# Patient Record
Sex: MALE | Race: WHITE | NOT HISPANIC OR LATINO | Employment: UNEMPLOYED | URBAN - METROPOLITAN AREA
[De-identification: names, ages, dates, MRNs, and addresses within clinical notes are randomized per-mention and may not be internally consistent; named-entity substitution may affect disease eponyms.]

---
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
## 2017-05-12 ENCOUNTER — ALLSCRIPTS OFFICE VISIT (OUTPATIENT)
Dept: OTHER | Facility: OTHER | Age: 22
End: 2017-05-12

## 2017-05-18 ENCOUNTER — LAB CONVERSION - ENCOUNTER (OUTPATIENT)
Dept: OTHER | Facility: OTHER | Age: 22
End: 2017-05-18

## 2017-05-18 LAB
A/G RATIO (HISTORICAL): 2.1 (CALC) (ref 1–2.5)
ALBUMIN SERPL BCP-MCNC: 4.8 G/DL (ref 3.6–5.1)
ALP SERPL-CCNC: 34 U/L (ref 40–115)
ALT SERPL W P-5'-P-CCNC: 6 U/L (ref 9–46)
AST SERPL W P-5'-P-CCNC: 9 U/L (ref 10–40)
BILIRUB SERPL-MCNC: 0.7 MG/DL (ref 0.2–1.2)
BUN SERPL-MCNC: 13 MG/DL (ref 7–25)
BUN/CREA RATIO (HISTORICAL): ABNORMAL (CALC) (ref 6–22)
CALCIUM (ADJUSTED FOR ALBUMIN) (HISTORICAL): 9.1 MG/DL (CALC) (ref 8.6–10.2)
CALCIUM SERPL-MCNC: 9.4 MG/DL (ref 8.6–10.3)
CHLORIDE SERPL-SCNC: 102 MMOL/L (ref 98–110)
CO2 SERPL-SCNC: 31 MMOL/L (ref 20–31)
CREAT SERPL-MCNC: 0.95 MG/DL (ref 0.6–1.35)
DEPRECATED RDW RBC AUTO: 12.9 % (ref 11–15)
EGFR AFRICAN AMERICAN (HISTORICAL): 131 ML/MIN/1.73M2
EGFR-AMERICAN CALC (HISTORICAL): 113 ML/MIN/1.73M2
FOLATE SERPL-MCNC: 18.2 NG/ML
GAMMA GLOBULIN (HISTORICAL): 2.3 G/DL (CALC) (ref 1.9–3.7)
GLUCOSE (HISTORICAL): 99 MG/DL (ref 65–99)
HBA1C MFR BLD HPLC: 5.1 % OF TOTAL HGB
HCT VFR BLD AUTO: 46.8 % (ref 38.5–50)
HGB BLD-MCNC: 15.6 G/DL (ref 13.2–17.1)
IGA (HISTORICAL): 269 MG/DL (ref 81–463)
INTERPRETATION (HISTORICAL): NORMAL
MCH RBC QN AUTO: 28.4 PG (ref 27–33)
MCHC RBC AUTO-ENTMCNC: 33.3 G/DL (ref 32–36)
MCV RBC AUTO: 85.2 FL (ref 80–100)
PLATELET # BLD AUTO: 232 THOUSAND/UL (ref 140–400)
PMV BLD AUTO: 8.2 FL (ref 7.5–12.5)
POTASSIUM SERPL-SCNC: 4.2 MMOL/L (ref 3.5–5.3)
RBC # BLD AUTO: 5.5 MILLION/UL (ref 4.2–5.8)
SODIUM SERPL-SCNC: 139 MMOL/L (ref 135–146)
T4 FREE SERPL-MCNC: 1.5 NG/DL (ref 0.8–2.7)
T4 TOTAL (HISTORICAL): 8.4 MCG/DL (ref 4.8–10.4)
TESTOSTERONE FREE (HISTORICAL): 144.7 PG/ML (ref 35–155)
TESTOSTERONE TOTAL (HISTORICAL): 742 NG/DL (ref 250–1100)
TOTAL PROTEIN (HISTORICAL): 7.1 G/DL (ref 6.1–8.1)
TSH SERPL DL<=0.05 MIU/L-ACNC: 0.81 MIU/L (ref 0.4–4.5)
TTG IGA (HISTORICAL): 1 U/ML
VIT B12 SERPL-MCNC: 559 PG/ML (ref 200–1100)
WBC # BLD AUTO: 6.1 THOUSAND/UL (ref 3.8–10.8)

## 2017-05-24 ENCOUNTER — ALLSCRIPTS OFFICE VISIT (OUTPATIENT)
Dept: OTHER | Facility: OTHER | Age: 22
End: 2017-05-24

## 2017-06-02 ENCOUNTER — ALLSCRIPTS OFFICE VISIT (OUTPATIENT)
Dept: OTHER | Facility: OTHER | Age: 22
End: 2017-06-02

## 2017-07-03 ENCOUNTER — ALLSCRIPTS OFFICE VISIT (OUTPATIENT)
Dept: OTHER | Facility: OTHER | Age: 22
End: 2017-07-03

## 2017-07-28 ENCOUNTER — ALLSCRIPTS OFFICE VISIT (OUTPATIENT)
Dept: OTHER | Facility: OTHER | Age: 22
End: 2017-07-28

## 2017-12-01 ENCOUNTER — ALLSCRIPTS OFFICE VISIT (OUTPATIENT)
Dept: OTHER | Facility: OTHER | Age: 22
End: 2017-12-01

## 2017-12-05 NOTE — PROGRESS NOTES
Assessment    1  Depression (311) (F32 9)   2  Anxiety (300 00) (F41 9)   3  Early satiety (780 94) (R68 81)   4  Loss of appetite for more than 2 weeks (783 0) (R63 0)    Plan  Abnormal weight loss, Loss of appetite for more than 2 weeks    · Cyproheptadine HCl - 4 MG Oral Tablet; TAKE 1 TABLET 4 TIMES DAILY  Anxiety    · ALPRAZolam 0 5 MG Oral Tablet; take 1 tablet PO daily PRN severe anxiety/panic   · PARoxetine HCl - 20 MG Oral Tablet; TAKE 1 TABLET EVERY DAY in AM  Early satiety, Loss of appetite for more than 2 weeks    · *1 - [facility]  *ongoing loss ofappetite and early satiety - improved with cyproheptadine but once d/c sxsreturn  he has responded well to paxil from emotional standpoint but unsure if sxswith eating could be physical - please eval   Status: Active  Requested for: 29QCO5369  Care Summary provided  : Yes    Discussion/Summary    69-year-old male presenting today for close follow-up to depression with anxiety as well as suppressed appetite  Overall patient is very happy with his response to Paxil and overall from an emotional standpoint has been doing very well  We will maintain him on the current dose of Paxil  Patient also has had an excellent response with improvement in appetite and weight gain with the Cyproheptadine  However, patient states he tried stopping the medication and his appetite immediately plummeted and he lost 4 lb in about a week  Once again I keep reiterating to him that I am uncertain if this is psychological or from a physical GI cause  He does report some early satiety when he does eat so I would recommend that we evaluate him from a physical gastro standpoint and I will have him consult with GI  Referral given  We will follow up with him in 4 months time   He wants to try to take the Cyproheptadine less frequently so I told him he can at least try taking it t i d  for a few weeks and then b i d  for a few weeks and see how he does   were filled for him today  Possible side effects of new medications were reviewed with the patient/guardian today  The treatment plan was reviewed with the patient/guardian  The patient/guardian understands and agrees with the treatment plan      Chief Complaint  pt here for 4 month f/u, pt states that he is been feeling much better with medication  Patient is here today for follow up of chronic conditions described in HPI  History of Present Illness  23y/o male here to f/u for depression with anxiety and appetite decrease  he is feeling a lot better overall and is extremely happy with how he feels on paxil  he is tolerating it well  He tried off of cyproheptadine and unfortunately his appetite dropped again and states he lost 4lbs in a week  eating smaller, less frequent, more because he had to eat, he states  denies physical sxs aside from early satiety  Review of Systems   Constitutional: No fever or chills, feels well, no tiredness, no recent weight gain or weight loss  Cardiovascular: No complaints of slow heart rate, no fast heart rate, no chest pain, no palpitations, no leg claudication, no lower extremity  Respiratory: No complaints of shortness of breath, no wheezing, no cough, no SOB on exertion, no orthopnea or PND  Gastrointestinal: as noted in HPI  Genitourinary: No complaints of dysuria, no incontinence, no hesitancy, no nocturia, no genital lesion, no testicular pain  Psychiatric: Is not suicidal, no sleep disturbances, no anxiety or depression, no change in personality, no emotional problems  Active Problems  1  Abdominal pain, epigastric (789 06) (R10 13)   2  Abnormal weight loss (783 21) (R63 4)   3  Anxiety (300 00) (F41 9)   4  Chronic fatigue (780 79) (R53 82)   5  Decreased libido (799 81) (R68 82)   6  Depression (311) (F32 9)   7  Episode of shaking (781 0) (R25 1)   8  Loose stools (787 7) (R19 5)   9   Loss of appetite for more than 2 weeks (783 0) (R63 0)    Past Medical History  1  History of Abdominal cramping (789 00) (R10 9)   2  History of Acute upper respiratory infection (465 9) (J06 9)   3  History of Closed Fracture Of Multiple Right Ribs (807 09)   4  History of Hepatic Laceration (864 05)   5  History of acute sinusitis (V12 69) (Z87 09)   6  History of allergic rhinitis (V12 69) (Z87 09)   7  History of fever (V13 89) (Z87 898)   8  History of gastroenteritis (V12 79) (Z87 19)   9  History of insomnia (V13 89) (Z87 898)   10  History of Joint Pain Hip Difficult To Localize   11  History of Open Wound Of The Face (873 40)    Family History  Family History    1  Family history of Mental problems    The family history was reviewed and updated today  Social History     · Single   · Student   · Unemployed (V62 0) (Z56 0)   · Use of nicotine containing substance in combustion-free vaporization device (V49 89)(Z78 9)  The social history was reviewed and updated today  Current Meds   1  ALPRAZolam 0 5 MG Oral Tablet; take 1 tablet PO daily PRN severe anxiety/panic; Therapy: 19VLW4282 to (Last Rx:90Vcv0514) Ordered   2  Cyproheptadine HCl - 4 MG Oral Tablet; TAKE 1 TABLET 4 TIMES DAILY; Therapy: 23UUJ8260 to (Evaluate:16Dwk6233)  Requested for: 29FNS7866; Last Rx:66Nml2957 Ordered   3  PARoxetine HCl - 20 MG Oral Tablet; TAKE 1 TABLET EVERY DAY in AM; Therapy: 62EAL4010 to ([phone])  Requested for: 18Mse6003; Last Rx:64Zvq7563 Ordered    The medication list was reviewed and updated today  Allergies  1  Amoxicillin CAPS   2  Penicillins   3   Sulfa Drugs    Vitals  Vital Signs    Recorded: 01XQF9499 04:14PM   Temperature 98 3 F, Tympanic   Heart Rate 98   Pulse Quality Normal   Respiration Quality Normal   Respiration 20   Systolic 978, LUE, Sitting   Diastolic 80, LUE, Sitting   Height 5 ft 7 in   Weight 119 lb 2 oz   BMI Calculated 18 66   BSA Calculated 1 62   O2 Saturation 98   Pain Scale 0       Physical Exam   Constitutional General appearance: No acute distress, well appearing and well nourished  appears healthy,-- within normal limits of ideal weight-- and-- appearance reflects stated age  Pulmonary  Respiratory effort: No increased work of breathing or signs of respiratory distress  Auscultation of lungs: Clear to auscultation, equal breath sounds bilaterally, no wheezes, no rales, no rhonci  Cardiovascular  Auscultation of heart: Normal rate and rhythm, normal S1 and S2, without murmurs  Examination of extremities for edema and/or varicosities: Normal    Carotid pulses: Normal    Abdomen  Abdomen: Non-tender, no masses  The abdomen was flat  Bowel sounds were normal  The abdomen was soft and nontender  Lymphatic  Palpation of lymph nodes in neck: No lymphadenopathy  Psychiatric  Orientation to person, place and time: Normal    Mood and affect: Normal    Additional Exam:  vitals reviewed  Results/Data  PHQ-9 Adult Depression Screening 15BMS3568 04:20PM User, [employer]     Test Name Result Flag Reference   PHQ-9 Adult Depression Score 6       Over the last two weeks, how often have you been bothered by any of the following problems? Little interest or pleasure in doing things: Several days - 1 Feeling down, depressed, or hopeless: Several days - 1 Trouble falling or staying asleep, or sleeping too much: Several days - 1 Feeling tired or having little energy: Several days - 1 Poor appetite or over eating: Not at all - 0 Feeling bad about yourself - or that you are a failure or have let yourself or your family down: Not at all - 0 Trouble concentrating on things, such as reading the newspaper or watching television: Several days - 1 Moving or speaking so slowly that other people could have noticed   Or the opposite -  being so fidgety or restless that you have been moving around a lot more than usual: Several days - 1 Thoughts that you would be better off dead, or of hurting yourself in some way: Not at all - 0   PHQ-9 Adult Depression Screening Negative     PHQ-9 Difficulty Level Somewhat difficult     PHQ-9 Severity Mild Depression         Future Appointments    Date/Time Provider Specialty Site   03/30/2018 04:15 PM Carol Ann Cope,  [address]   Electronically signed by : Tomi Darling, [facility]; Dec  1 2017  5:14PM EST                       (Author)    Electronically signed by : Christianne Caldera DO; Dec  1 2017  5:59PM EST                       (Author)

## 2018-01-12 VITALS
DIASTOLIC BLOOD PRESSURE: 72 MMHG | WEIGHT: 107.56 LBS | SYSTOLIC BLOOD PRESSURE: 124 MMHG | RESPIRATION RATE: 16 BRPM | HEIGHT: 67 IN | BODY MASS INDEX: 16.88 KG/M2 | HEART RATE: 95 BPM | OXYGEN SATURATION: 99 % | TEMPERATURE: 99.6 F

## 2018-01-13 VITALS
WEIGHT: 108.13 LBS | HEART RATE: 102 BPM | DIASTOLIC BLOOD PRESSURE: 70 MMHG | HEIGHT: 67 IN | TEMPERATURE: 99.2 F | BODY MASS INDEX: 16.97 KG/M2 | SYSTOLIC BLOOD PRESSURE: 98 MMHG | OXYGEN SATURATION: 97 % | RESPIRATION RATE: 16 BRPM

## 2018-01-13 NOTE — PSYCH
History of Present Illness  Psychotherapy Provided [facility]: Individual Psychotherapy 35 minutes provided today  Goals addressed in session:   Met with pt for the initial session  Discussed pt's physical issues that he has been experiencing which led to a referral to this worker as they may be linked to his depression and anxiety  Pt states that he has struggled with anxiety and depression for years  Discussed pt's negative coping skills in the past including his drug use  Discussed pt's symptoms including irritability, lack of interest in things, lack of motivation, with drawing from others, and decreased appetite  Pt will follow up in a few weeks to continue to talk about coping skills and gain support  HPI - Psych: Pt is not currently on anything for the depression/anxiety  Pt is considering medication but has not started anything at this time  Pt reports that he is going to school full time for Biology and that he does enjoy it  Pt has some supports within his family  Pt states that he does enjoy creating music and working out  Pt was calm and cooperative throughout the session  Pt's mood and affect were within normal limits  Pt denies SI   Note   Note:   Pt will follow up with this worker in a few weeks for continued support and to work on establishing some coping skills that would be beneficial for him to use when managing his anxiety/depression  Assessment    1   Depression (311) (F32 9)    Signatures   Electronically signed by : Estrella Rivas LCSW; Jun 2 2017  4:39PM EST                       (Author)

## 2018-01-14 VITALS
OXYGEN SATURATION: 99 % | RESPIRATION RATE: 17 BRPM | TEMPERATURE: 98.9 F | DIASTOLIC BLOOD PRESSURE: 80 MMHG | WEIGHT: 100.06 LBS | HEART RATE: 113 BPM | SYSTOLIC BLOOD PRESSURE: 138 MMHG | HEIGHT: 67 IN | BODY MASS INDEX: 15.71 KG/M2

## 2018-01-14 VITALS
SYSTOLIC BLOOD PRESSURE: 130 MMHG | DIASTOLIC BLOOD PRESSURE: 60 MMHG | TEMPERATURE: 99.7 F | RESPIRATION RATE: 17 BRPM | WEIGHT: 101.25 LBS | OXYGEN SATURATION: 98 % | HEART RATE: 82 BPM | HEIGHT: 67 IN | BODY MASS INDEX: 15.89 KG/M2

## 2018-01-22 VITALS
HEART RATE: 98 BPM | RESPIRATION RATE: 20 BRPM | HEIGHT: 67 IN | TEMPERATURE: 98.3 F | WEIGHT: 119.13 LBS | SYSTOLIC BLOOD PRESSURE: 118 MMHG | BODY MASS INDEX: 18.7 KG/M2 | OXYGEN SATURATION: 98 % | DIASTOLIC BLOOD PRESSURE: 80 MMHG

## 2018-02-05 DIAGNOSIS — F41.9 ANXIETY: Primary | ICD-10-CM

## 2018-02-05 RX ORDER — ALPRAZOLAM 0.5 MG/1
1 TABLET ORAL DAILY PRN
COMMUNITY
Start: 2017-05-24 | End: 2018-02-05 | Stop reason: SDUPTHER

## 2018-02-05 RX ORDER — ALPRAZOLAM 0.5 MG/1
0.5 TABLET ORAL DAILY PRN
Qty: 30 TABLET | Refills: 0 | OUTPATIENT
Start: 2018-02-05 | End: 2018-03-30 | Stop reason: SDUPTHER

## 2018-02-13 ENCOUNTER — TRANSCRIBE ORDERS (OUTPATIENT)
Dept: GASTROENTEROLOGY | Facility: MEDICAL CENTER | Age: 23
End: 2018-02-13

## 2018-02-16 ENCOUNTER — OFFICE VISIT (OUTPATIENT)
Dept: GASTROENTEROLOGY | Facility: MEDICAL CENTER | Age: 23
End: 2018-02-16
Payer: COMMERCIAL

## 2018-02-16 VITALS
WEIGHT: 119 LBS | TEMPERATURE: 98.2 F | HEIGHT: 67 IN | SYSTOLIC BLOOD PRESSURE: 116 MMHG | HEART RATE: 90 BPM | DIASTOLIC BLOOD PRESSURE: 72 MMHG | BODY MASS INDEX: 18.68 KG/M2

## 2018-02-16 DIAGNOSIS — R63.0 LOSS OF APPETITE FOR MORE THAN 2 WEEKS: ICD-10-CM

## 2018-02-16 DIAGNOSIS — R19.5 LOOSE STOOLS: ICD-10-CM

## 2018-02-16 DIAGNOSIS — R68.81 EARLY SATIETY: ICD-10-CM

## 2018-02-16 DIAGNOSIS — R63.4 ABNORMAL WEIGHT LOSS: Primary | ICD-10-CM

## 2018-02-16 PROCEDURE — 99244 OFF/OP CNSLTJ NEW/EST MOD 40: CPT | Performed by: INTERNAL MEDICINE

## 2018-02-16 RX ORDER — CYPROHEPTADINE HYDROCHLORIDE 4 MG/1
4 TABLET ORAL 4 TIMES DAILY
Refills: 3 | COMMUNITY
Start: 2018-01-21 | End: 2018-06-29 | Stop reason: SDUPTHER

## 2018-02-16 RX ORDER — PAROXETINE HYDROCHLORIDE 20 MG/1
TABLET, FILM COATED ORAL
COMMUNITY
Start: 2018-02-05 | End: 2018-03-30 | Stop reason: SDUPTHER

## 2018-02-16 NOTE — PROGRESS NOTES
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  However I am concerned about difficulty he has had gaining weight and the fact that he is reliant on the cyproheptadine to maintain his weight  I will schedule him for an upper endoscopy and colonoscopy to rule out peptic ulcer disease, inflammatory bowel disease, celiac sprue, and microscopic colitis  Since he has previously had a TSH and celiac panel that were negative within the past year I will not repeat them at this time  - Case request operating room: EGD AND COLONOSCOPY      ______________________________________________________________________    HPI:  He presents for evaluation of chronic decreased appetite, difficulty gaining weight, intermittent weight loss when he is not taking cyproheptadine, nausea, early satiety, and diarrhea  He has not had any bleeding and he has never previously had an upper endoscopy or colonoscopy  He denies any family history of colon polyps, colon cancer, and Crohn's disease  He is concerned about his persistent symptoms and the fact that he has been unable to gain weight and when he stops his cyproheptadine he quickly starts losing weight  REVIEW OF SYSTEMS:    CONSTITUTIONAL: Denies any fever, chills, rigors, but complains of weight loss  HEENT: No earache or tinnitus  Denies hearing loss or visual disturbances  CARDIOVASCULAR: No chest pain or palpitations  RESPIRATORY: Denies any cough, hemoptysis, shortness of breath or dyspnea on exertion  GASTROINTESTINAL: As noted in the History of Present Illness  GENITOURINARY: No problems with urination  Denies any hematuria or dysuria  NEUROLOGIC: No dizziness or vertigo, denies headaches  MUSCULOSKELETAL: Denies any muscle or joint pain  SKIN: Denies skin rashes or itching  ENDOCRINE: Denies excessive thirst  Denies intolerance to heat or cold  PSYCHOSOCIAL: Denies depression or anxiety  Denies any recent memory loss  Historical Information   No past medical history on file  No past surgical history on file  Social History   History   Alcohol use Not on file     History   Drug use: Unknown     History   Smoking Status    Never Smoker   Smokeless Tobacco    Never Used     No family history on file  Meds/Allergies       Current Outpatient Prescriptions:     ALPRAZolam (XANAX) 0 5 mg tablet    cyproheptadine (PERIACTIN) 4 mg tablet    PARoxetine (PAXIL) 20 mg tablet    Allergies   Allergen Reactions    Amoxicillin Other (See Comments)     unknown    Penicillins Edema    Sulfa Antibiotics Other (See Comments)     unknown           Objective     Blood pressure 116/72, pulse 90, temperature 98 2 °F (36 8 °C), temperature source Oral, height 5' 7" (1 702 m), weight 54 kg (119 lb)  PHYSICAL EXAM:      General Appearance:   Alert, cooperative, no distress, thin   HEENT:   Normocephalic, atraumatic, anicteric      Neck:  Supple, symmetrical, trachea midline   Lungs:   Clear to auscultation bilaterally; no rales, rhonchi or wheezing; respirations unlabored    Heart[de-identified]   Regular rate and rhythm; no murmur, rub, or gallop  Abdomen:   Soft, epigastric tenderness, non-distended; normal bowel sounds; no masses, no organomegaly    Genitalia:   Deferred    Rectal:   Deferred    Extremities:  No cyanosis, clubbing or edema    Pulses:  2+ and symmetric    Skin:  No jaundice, rashes, or lesions    Lymph nodes:  No palpable cervical lymphadenopathy        Lab Results:   No visits with results within 1 Day(s) from this visit     Latest known visit with results is:   Lab Conversion - Encounter on 05/18/2017   Component Date Value    TTG IGA 05/13/2017 1     IGA 05/13/2017 269     INTERPRETATION 05/13/2017 No serological evidence of celiac disease  tTG IgA may normalize in individuals with celiac diseasewho maintain a gluten-free diet  Consider HLA DQ2 andDQ8 testing to rule out celiac disease  Celiac diseaseis extremely rare in the absence of DQ2 or DQ8  Radiology Results:   No results found

## 2018-02-16 NOTE — LETTER
February 16, 2018     Pearl Rivermax Brand, DO  [facility]  [address]    Patient: Nubia Galeas   YOB: 1995   Date of Visit: 2/16/2018       Dear Dr Leslee Soto: Thank you for referring Sandie Horta to me for evaluation  Below are my notes for this consultation  If you have questions, please do not hesitate to call me  I look forward to following your patient along with you  Sincerely,        Avi Joshua MD        CC: No Recipients  Avi Joshua MD  2/16/2018  3:04 PM  Sign at close encounter  [facility] - Outpatient Consultation  Nubia Galeas 20 y o  male MRN: [id number]  Encounter: [id number]          ASSESSMENT AND PLAN:      1  Abnormal weight loss, poor appetite, loose stools    His poor appetite, difficulty gaining weight, weight loss, loose stools, and early satiety are most likely due to functional dyspepsia and diarrhea predominant irritable bowel syndrome  However I am concerned about difficulty he has had gaining weight and the fact that he is reliant on the cyproheptadine to maintain his weight  I will schedule him for an upper endoscopy and colonoscopy to rule out peptic ulcer disease, inflammatory bowel disease, celiac sprue, and microscopic colitis  Since he has previously had a TSH and celiac panel that were negative within the past year I will not repeat them at this time  - Case request operating room: EGD AND COLONOSCOPY      ______________________________________________________________________    HPI:  He presents for evaluation of chronic decreased appetite, difficulty gaining weight, intermittent weight loss when he is not taking cyproheptadine, nausea, early satiety, and diarrhea  He has not had any bleeding and he has never previously had an upper endoscopy or colonoscopy  He denies any family history of colon polyps, colon cancer, and Crohn's disease    He is concerned about his persistent symptoms and the fact that he has been unable to gain weight and when he stops his cyproheptadine he quickly starts losing weight  REVIEW OF SYSTEMS:    CONSTITUTIONAL: Denies any fever, chills, rigors, but complains of weight loss  HEENT: No earache or tinnitus  Denies hearing loss or visual disturbances  CARDIOVASCULAR: No chest pain or palpitations  RESPIRATORY: Denies any cough, hemoptysis, shortness of breath or dyspnea on exertion  GASTROINTESTINAL: As noted in the History of Present Illness  GENITOURINARY: No problems with urination  Denies any hematuria or dysuria  NEUROLOGIC: No dizziness or vertigo, denies headaches  MUSCULOSKELETAL: Denies any muscle or joint pain  SKIN: Denies skin rashes or itching  ENDOCRINE: Denies excessive thirst  Denies intolerance to heat or cold  PSYCHOSOCIAL: Denies depression or anxiety  Denies any recent memory loss  Historical Information   No past medical history on file  No past surgical history on file  Social History   History   Alcohol use Not on file     History   Drug use: Unknown     History   Smoking Status    Never Smoker   Smokeless Tobacco    Never Used     No family history on file  Meds/Allergies       Current Outpatient Prescriptions:     ALPRAZolam (XANAX) 0 5 mg tablet    cyproheptadine (PERIACTIN) 4 mg tablet    PARoxetine (PAXIL) 20 mg tablet    Allergies   Allergen Reactions    Amoxicillin Other (See Comments)     unknown    Penicillins Edema    Sulfa Antibiotics Other (See Comments)     unknown           Objective     Blood pressure 116/72, pulse 90, temperature 98 2 °F (36 8 °C), temperature source Oral, height 5' 7" (1 702 m), weight 54 kg (119 lb)          PHYSICAL EXAM:      General Appearance:   Alert, cooperative, no distress, thin   HEENT:   Normocephalic, atraumatic, anicteric      Neck:  Supple, symmetrical, trachea midline   Lungs:   Clear to auscultation bilaterally; no rales, rhonchi or wheezing; respirations unlabored    Heart[de-identified]   Regular rate and rhythm; no murmur, rub, or gallop  Abdomen:   Soft, epigastric tenderness, non-distended; normal bowel sounds; no masses, no organomegaly    Genitalia:   Deferred    Rectal:   Deferred    Extremities:  No cyanosis, clubbing or edema    Pulses:  2+ and symmetric    Skin:  No jaundice, rashes, or lesions    Lymph nodes:  No palpable cervical lymphadenopathy        Lab Results:   No visits with results within 1 Day(s) from this visit  Latest known visit with results is:   Lab Conversion - Encounter on 05/18/2017   Component Date Value    TTG IGA 05/13/2017 1     IGA 05/13/2017 269     INTERPRETATION 05/13/2017 No serological evidence of celiac disease  tTG IgA may normalize in individuals with celiac diseasewho maintain a gluten-free diet  Consider HLA DQ2 andDQ8 testing to rule out celiac disease  Celiac diseaseis extremely rare in the absence of DQ2 or DQ8  Radiology Results:   No results found

## 2018-02-20 ENCOUNTER — ANESTHESIA EVENT (OUTPATIENT)
Dept: GASTROENTEROLOGY | Facility: MEDICAL CENTER | Age: 23
End: 2018-02-20
Payer: COMMERCIAL

## 2018-02-21 ENCOUNTER — HOSPITAL ENCOUNTER (OUTPATIENT)
Facility: MEDICAL CENTER | Age: 23
Setting detail: OUTPATIENT SURGERY
Discharge: HOME | End: 2018-02-21
Attending: INTERNAL MEDICINE | Admitting: INTERNAL MEDICINE
Payer: COMMERCIAL

## 2018-02-21 ENCOUNTER — ANESTHESIA (OUTPATIENT)
Dept: GASTROENTEROLOGY | Facility: MEDICAL CENTER | Age: 23
End: 2018-02-21
Payer: COMMERCIAL

## 2018-02-21 VITALS
HEART RATE: 81 BPM | OXYGEN SATURATION: 98 % | DIASTOLIC BLOOD PRESSURE: 55 MMHG | BODY MASS INDEX: 19.13 KG/M2 | TEMPERATURE: 99.9 F | WEIGHT: 119 LBS | SYSTOLIC BLOOD PRESSURE: 96 MMHG | RESPIRATION RATE: 16 BRPM | HEIGHT: 66 IN

## 2018-02-21 DIAGNOSIS — R63.4 ABNORMAL WEIGHT LOSS: ICD-10-CM

## 2018-02-21 PROCEDURE — 45380 COLONOSCOPY AND BIOPSY: CPT | Performed by: INTERNAL MEDICINE

## 2018-02-21 PROCEDURE — 88342 IMHCHEM/IMCYTCHM 1ST ANTB: CPT | Performed by: INTERNAL MEDICINE

## 2018-02-21 PROCEDURE — 88305 TISSUE EXAM BY PATHOLOGIST: CPT | Performed by: INTERNAL MEDICINE

## 2018-02-21 PROCEDURE — 43239 EGD BIOPSY SINGLE/MULTIPLE: CPT | Performed by: INTERNAL MEDICINE

## 2018-02-21 PROCEDURE — 88305 TISSUE EXAM BY PATHOLOGIST: CPT | Performed by: PATHOLOGY

## 2018-02-21 RX ORDER — PROPOFOL 10 MG/ML
INJECTION, EMULSION INTRAVENOUS AS NEEDED
Status: DISCONTINUED | OUTPATIENT
Start: 2018-02-21 | End: 2018-02-21 | Stop reason: SURG

## 2018-02-21 RX ORDER — ONDANSETRON 2 MG/ML
4 INJECTION INTRAMUSCULAR; INTRAVENOUS ONCE AS NEEDED
Status: DISCONTINUED | OUTPATIENT
Start: 2018-02-21 | End: 2018-02-21 | Stop reason: HOSPADM

## 2018-02-21 RX ORDER — SODIUM CHLORIDE 9 MG/ML
125 INJECTION, SOLUTION INTRAVENOUS CONTINUOUS
Status: DISCONTINUED | OUTPATIENT
Start: 2018-02-21 | End: 2018-02-21 | Stop reason: HOSPADM

## 2018-02-21 RX ADMIN — PROPOFOL 150 MG: 10 INJECTION, EMULSION INTRAVENOUS at 12:07

## 2018-02-21 RX ADMIN — SODIUM CHLORIDE 125 ML/HR: 0.9 INJECTION, SOLUTION INTRAVENOUS at 11:28

## 2018-02-21 RX ADMIN — PROPOFOL 50 MG: 10 INJECTION, EMULSION INTRAVENOUS at 12:08

## 2018-02-21 RX ADMIN — PROPOFOL 30 MG: 10 INJECTION, EMULSION INTRAVENOUS at 12:20

## 2018-02-21 RX ADMIN — PROPOFOL 50 MG: 10 INJECTION, EMULSION INTRAVENOUS at 12:12

## 2018-02-21 RX ADMIN — PROPOFOL 10 MG: 10 INJECTION, EMULSION INTRAVENOUS at 12:25

## 2018-02-21 RX ADMIN — PROPOFOL 30 MG: 10 INJECTION, EMULSION INTRAVENOUS at 12:16

## 2018-02-21 RX ADMIN — PROPOFOL 30 MG: 10 INJECTION, EMULSION INTRAVENOUS at 12:18
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
## 2018-02-21 NOTE — OP NOTE
**** GI/ENDOSCOPY REPORT ****     PATIENT NAME: Michael Pang ------ VISIT ID:  Patient ID:   [id number] YOB: 1995     INTRODUCTION: Colonoscopy - A 20 male patient presents for an outpatient   Colonoscopy at [address] [place] [address]  PREVIOUS COLONOSCOPY: No prior colonoscopy  INDICATIONS: Diarrhea  Loss of weight  CONSENT:  The benefits, risks, and alternatives to the procedure were   discussed and informed consent was obtained from the patient  PREPARATION: EKG, pulse, pulse oximetry and blood pressure were monitored   throughout the procedure  The patient was identified by myself both   verbally and by visual inspection of ID band  Airway Assessment   Classification: Airway class 2 - Visualization of the soft palate, fauces   and uvula  ASA Classification: Class 2 - Patient has mild to moderate   systemic disturbance that may or may not be related to the disorder   requiring surgery  MEDICATIONS: Anesthesia-check records     PROCEDURE:  The endoscope was passed without difficulty through the anus   under direct visualization and advanced to the terminal ileum  The scope   was withdrawn and the mucosa was carefully examined  The quality of the   preparation was good  Retroflexion was performed  Cecal Intubation Time: 3   minutes(s) Scope Withdrawal Time: 5 minutes(s)     RECTAL EXAM: Normal rectal exam      FINDINGS:  The terminal ileum and entire colon appeared to be normal    Multiple random biopsies was taken  COMPLICATIONS: There were no complications  IMPRESSIONS: Normal terminal ileum and entire colon  RECOMMENDATIONS: Follow-up on the results of the biopsy specimens  Follow-up appointment with endoscopist      ESTIMATED BLOOD LOSS:     PATHOLOGY SPECIMENS: Multiple random biopsies taken       PROCEDURE CODES:     ICD-9 Codes: 787 91 Diarrhea 783 21 Loss of weight     ICD-10 Codes: R19 7 Diarrhea, unspecified R63 4 Abnormal weight loss     PERFORMED BY: MARBELLA Leal  on 02/21/2018  Version 1, electronically signed by MARBELLA Hensley  on 02/21/2018   at 12:33

## 2018-02-21 NOTE — DISCHARGE INSTRUCTIONS
Upper Endoscopy   WHAT YOU NEED TO KNOW:   An upper endoscopy is also called an upper gastrointestinal (GI) endoscopy, or an esophagogastroduodenoscopy (EGD)  You may feel bloated, gassy, or have some abdominal discomfort after your procedure  Your throat may be sore for 24 to 36 hours  You may burp or pass gas from air that is still inside your body  DISCHARGE INSTRUCTIONS:   Call 911 if:   · You have sudden chest pain or trouble breathing  Seek care immediately if:   · You feel dizzy or faint  · You have trouble swallowing  · You have severe throat pain  · Your bowel movements are very dark or black  · Your abdomen is hard and firm and you have severe pain  · You vomit blood  Contact your healthcare provider if:   · You feel full or bloated and cannot burp or pass gas  · You have not had a bowel movement for 3 days after your procedure  · You have neck pain  · You have a fever or chills  · You have nausea or are vomiting  · You have a rash or hives  · You have questions or concerns about your endoscopy  Relieve a sore throat:  Suck on throat lozenges or crushed ice  Gargle with a small amount of warm salt water  Mix 1 teaspoon of salt and 1 cup of warm water to make salt water  Relieve gas and discomfort from bloating:  Lie on your right side with a heating pad on your abdomen  Take short walks to help pass gas  Eat small meals until bloating is relieved  Rest after your procedure:  Do not drive or make important decisions until the day after your procedure  Return to your normal activity as directed  You can usually return to work the day after your procedure  Follow up with your healthcare provider as directed:  Write down your questions so you remember to ask them during your visits  © 2017 [employer] Information is for End User's use only and may not be sold, redistributed or otherwise used for commercial purposes   All illustrations and images included in CareNotes® are the copyrighted property of [employer]  or [employer]  The above information is an  only  It is not intended as medical advice for individual conditions or treatments  Talk to your doctor, nurse or pharmacist before following any medical regimen to see if it is safe and effective for you  Hiatal Hernia   WHAT YOU NEED TO KNOW:   What is a hiatal hernia? A hiatal hernia is a condition that causes part of your stomach to bulge through the hiatus (small opening) in your diaphragm  The part of the stomach may move up and down, or it may get trapped above the diaphragm  What increases my risk for a hiatal hernia? The exact cause of a hiatal hernia is not known  You may have been born with a large hiatus  The following may increase your risk of a hiatal hernia:  · Obesity    · Older age    · Medical conditions such as diverticulosis or esophagitis    · Previous surgery of the esophagus or stomach or trauma such as from a motor vehicle accident  What are the types of hiatal hernia? · Type I (sliding hiatal hernia): A portion of the stomach slides in and out of the hiatus  This type is the most common and usually causes gastroesophageal reflux disease (GERD)  GERD occurs when the esophageal sphincter does not close properly and causes acid reflux  The esophageal sphincter is the lower muscle of the esophagus  · Type II (paraesophageal hiatal hernia):  Type II hiatal hernia forms when a part of the stomach squeezes through the hiatus and lies next to the esophagus  · Type III (combined):  Type III hiatal hernia is a combination of a sliding and a paraesophageal hiatal hernia  · Type IV (complex paraesophageal hiatal hernia): The whole stomach, the small and large bowels, spleen, pancreas, or liver is pushed up into the chest   What are the signs and symptoms of a hiatal hernia?   The most common symptom is heartburn  This usually occurs after meals and spreads to your neck, jaw, or shoulder  You may have no signs or symptoms, or you may have any of the following:  · Abdominal pain, especially in the area just above your navel    · Bitter or acid taste in your mouth    · Trouble swallowing    · Coughing or hoarseness    · Chest pain or shortness of breath that occurs after eating    · Frequent burping or hiccups    · Uncomfortable feeling of fullness after eating  How is a hiatal hernia diagnosed? · An upper GI series test  includes x-rays of your esophagus, stomach, and your small intestines  It is also called a barium swallow test  You will be given barium (a chalky liquid) to drink before the pictures are taken  This liquid helps your stomach and intestines show up better on the x-rays  An upper GI series can show if you have an ulcer, a blocked intestine, or other problems  · An endoscopy  uses a scope to see the inside of your digestive tract  A scope is a long, bendable tube with a light on the end of it  A camera may be hooked to the scope to take pictures  How is a hiatal hernia treated? Treatment depends on the type of hiatal hernia you have and on your symptoms  You may not need any treatment  You may need any of the following:  · Medicines  may be given to relieve heartburn symptoms  These medicines help to decrease or block stomach acid  You may also be given medicines that help to tighten the esophageal sphincter  · Surgery  may be done when medicines cannot control your symptoms, or other problems are present  Your healthcare provider may also suggest surgery depending on the type of hernia you have  Your healthcare provider can put your stomach back into its normal location  He may make the hiatus (hole) smaller and anchor your stomach in your abdomen  Fundoplication is a surgery that wraps the upper part of the stomach around the esophageal sphincter to strengthen it    How can I manage symptoms? The following nutrition and lifestyle changes may be recommended to relieve symptoms of heartburn  · Avoid foods that make your symptoms worse  These may include spicy foods, fruit juices, alcohol, caffeine, chocolate, and mint  · Eat several small meals during the day  Small meals give your stomach less food to digest     · Avoid lying down and bending forward after you eat  Do not eat meals 2 to 3 hours before bedtime  This decreases your risk for reflux  · Maintain a healthy weight  If you are overweight, weight loss may help relieve your symptoms  · Sleep with your head elevated  at least 6 inches  · Do not smoke  Smoking can increase your symptoms of heartburn  When should I seek immediate care? · You have severe abdominal pain  · You try to vomit but nothing comes out (retching)  · You have severe chest pain and sudden trouble breathing  · Your bowel movements are black or bloody  · Your vomit looks like coffee grounds or has blood in it  When should I contact my healthcare provider? · Your symptoms are getting worse  · You have nausea, and you are vomiting  · You are losing weight without trying  · You have questions or concerns about your condition or care  CARE AGREEMENT:   You have the right to help plan your care  Learn about your health condition and how it may be treated  Discuss treatment options with your caregivers to decide what care you want to receive  You always have the right to refuse treatment  The above information is an  only  It is not intended as medical advice for individual conditions or treatments  Talk to your doctor, nurse or pharmacist before following any medical regimen to see if it is safe and effective for you  © 2017 [employer] Information is for End User's use only and may not be sold, redistributed or otherwise used for commercial purposes   All illustrations and images included in CareNotes® are the copyrighted property of [employer]  or [employer]  Colonoscopy   WHAT YOU NEED TO KNOW:   A colonoscopy is a procedure to examine the inside of your colon (intestine) with a scope  Polyps or tissue growths may have been removed during your colonoscopy  It is normal to feel bloated and to have some abdominal discomfort  You should be passing gas  If you have hemorrhoids or you had polyps removed, you may have a small amount of bleeding  DISCHARGE INSTRUCTIONS:   Seek care immediately if:   · You have a large amount of bright red blood in your bowel movements  · Your abdomen is hard and firm and you have severe pain  · You have sudden trouble breathing  Contact your healthcare provider if:   · You develop a rash or hives  · You have a fever within 24 hours of your procedure  · You have not had a bowel movement for 3 days after your procedure  · You have questions or concerns about your condition or care  Activity:   · Do not lift, strain, or run  for 3 days after your procedure  · Rest after your procedure  You have been given medicine to relax you  Do not  drive or make important decisions until the day after your procedure  Return to your normal activity as directed  · Relieve gas and discomfort from bloating  by lying on your right side with a heating pad on your abdomen  You may need to take short walks to help the gas move out  Eat small meals until bloating is relieved  If you had polyps removed: For 7 days after your procedure:  · Do not  take aspirin  · Do not  go on long car rides  Help prevent constipation:   · Eat a variety of healthy foods  Healthy foods include fruit, vegetables, whole-grain breads, low-fat dairy products, beans, lean meat, and fish  Ask if you need to be on a special diet  Your healthcare provider may recommend that you eat high-fiber foods such as cooked beans   Fiber helps you have regular bowel movements  · Drink liquids as directed  Adults should drink between 9 and 13 eight-ounce cups of liquid every day  Ask what amount is best for you  For most people, good liquids to drink are water, juice, and milk  · Exercise as directed  Talk to your healthcare provider about the best exercise plan for you  Exercise can help prevent constipation, decrease your blood pressure and improve your health  Follow up with your healthcare provider as directed:  Write down your questions so you remember to ask them during your visits  © 2017 [address]  Information is for End User's use only and may not be sold, redistributed or otherwise used for commercial purposes  All illustrations and images included in CareNotes® are the copyrighted property of [employer]  or [employer]  The above information is an  only  It is not intended as medical advice for individual conditions or treatments  Talk to your doctor, nurse or pharmacist before following any medical regimen to see if it is safe and effective for you  Cigarette Smoking and Your Health   WHAT YOU NEED TO KNOW:   What are the risks to my health if I smoke tobacco?  Nicotine and other chemicals found in tobacco damage every cell in your body  Even if you are a light smoker, you have an increased risk for cancer, heart disease, and lung disease  If you are pregnant or have diabetes, smoking increases your risk for complications  What are the benefits to my health if I stop smoking? · You decrease respiratory symptoms such as coughing, wheezing, and shortness of breath  · You reduce your risk for cancers of the lung, mouth, throat, kidney, bladder, pancreas, stomach, and cervix  If you already have cancer, you increase the benefits of chemotherapy  You also reduce your risk for cancer returning or a second cancer from developing       · You reduce your risk for heart disease, blood clots, heart attack, and stroke  · You reduce your risk for lung infections, and diseases such as pneumonia, asthma, chronic bronchitis, and emphysema  · Your circulation improves  More oxygen can be delivered to your body  If you have diabetes, you lower your risk for complications, such as kidney, artery, and eye diseases  You also lower your risk for nerve damage  Nerve damage can lead to amputations, poor vision, and blindness  · You improve your body's ability to heal and to fight infections  What are the health benefits to others if I stop smoking? Tobacco is harmful to nonsmokers who breathe in your secondhand smoke  The following are ways the health of others around you may improve when you stop smoking:  · You lower the risks for lung cancer and heart disease in nonsmoking adults  · If you are pregnant, you lower the risk for miscarriage, early delivery, low birth weight, and stillbirth  You also lower your baby's risk for SIDS, obesity, developmental delay, and neurobehavioral problems, such as ADHD  · If you have children, you lower their risk for ear infections, colds, pneumonia, bronchitis, and asthma  Where can I find more information and support to stop smoking? · [employer]  Phone: 0- 376 - 589-7708  Web Address: www [employer]  CARE AGREEMENT:   You have the right to help plan your care  Learn about your health condition and how it may be treated  Discuss treatment options with your caregivers to decide what care you want to receive  You always have the right to refuse treatment  The above information is an  only  It is not intended as medical advice for individual conditions or treatments  Talk to your doctor, nurse or pharmacist before following any medical regimen to see if it is safe and effective for you  © 2017 [employer] Information is for End User's use only and may not be sold, redistributed or otherwise used for commercial purposes   All illustrations and images included in CareNotes® are the copyrighted property of A [employer] M [employer]  or [address]  How to Stop Smoking   WHAT YOU NEED TO KNOW:   Why should I stop smoking? You will improve your health and the health of others around you if you stop smoking  Your risk for heart and lung disease, cancer, stroke, heart attack, and vision problems will also decrease  You can benefit from quitting no matter how long you have smoked  How can I prepare to stop smoking? Nicotine is a highly addictive drug found in cigarettes  Withdrawal symptoms can happen when you stop smoking and make it hard to quit  These include anxiety, depression, irritability, trouble sleeping, and increased appetite  You increase your chances of success if you prepare to quit  · Set a quit date  Piotr Alcantar a date that is within the next 2 weeks  Do not pick a day that you think may be stressful or busy  Write down the day or [place] it on your calender  · Tell friends and family that you plan to quit  Explain that you may have withdrawal symptoms when you try to quit  Ask them to support you  They may be able to encourage you and help reduce your stress to make it easier for you to quit  · Make a list of your reasons for quitting  Put the list somewhere you will see it every day, such as your refrigerator  You can look at the list when you have a craving  · Remove all tobacco and nicotine products from your home, car, and workplace  Also, remove anything else that will tempt you to smoke, such as lighters, matches, or ashtrays  Clean your car, home, and places at work that smell like smoke  The smell of smoke can trigger a craving  · Identify triggers that make you want to smoke  This may include activities, feelings, or people  Also write down 1 way you can deal with each of your triggers   For example, if you want to smoke as soon as you wake up, plan another activity during this time, such as exercise  · Make a plan for how you will quit  Learn about the tools that can help you quit, such as medicine, counseling, or nicotine replacement therapy  Choose at least 2 options to help you quit  What are some tools to help me stop smoking? · Counseling  from a trained healthcare provider can provide you with support and skills to quit smoking  The provider will also teach you to manage your withdrawal symptoms and cravings  You may receive counseling from one counselor, in group therapy, or through phone therapy called a quit line  · Nicotine replacement therapy (NRT)  such as nicotine patches, gum, or lozenges may help reduce your nicotine cravings  You may get these without a doctor's order  Do not use e-cigarettes or smokeless tobacco in place of cigarettes or to help you quit  They still contain nicotine  · Prescription medicines  such as nasal sprays or nicotine inhalers may help reduce your withdrawal symptoms  Other medicines may also be used to reduce your urge to smoke  Ask your healthcare provider about these medicines  You may need to start certain medicines 2 weeks before your quit date for them to work well  · Hypnosis  is a practice that helps guide you through thoughts and feelings  Hypnosis may help decrease your cravings and make you more willing to quit  · Acupuncture therapy  uses very thin needles to balance energy channels in the body  This is thought to help decrease cravings and symptoms of nicotine withdrawal      · Support groups  let you talk to others who are trying to quit or have already quit  It may be helpful to speak with others about how they quit  How can I manage my cravings? · Avoid situations, people, and places that tempt you to smoke  Go to nonsmoking places, such as libraries or restaurants  Understand what tempts you and try to avoid these things  · Keep your hands busy  Hold things such as a stress ball or pen       · Put candy or toothpicks in your mouth  Keep lollipops, sugarless gum, or toothpicks with you at all times  · Do not have alcohol or caffeine  These drinks may tempt you to smoke  Drink healthy liquids such as water or juice instead  · Reward yourself when you resist your cravings  Rewards will motivate you and help you stay positive  · Do an activity that distracts you from your craving  Examples include going for a walk, exercising, or cleaning  What should I know about weight gain after I quit? You may gain a few pounds after you quit smoking  It is healthier for you to gain a few pounds than to continue to smoke  The following can help you prevent weight gain:  · Eat healthy foods  These include fruits, vegetables, whole-grain breads, low-fat dairy products, beans, lean meats, and fish  Eat healthy snacks, such as low-fat yogurt, if you get hungry between meals  · Drink water before, during, and between meals  This will make your stomach feel full and help prevent you from overeating  Ask your healthcare provider how much liquid to drink each day and which liquids are best for you  · Exercise  Take a walk or do some kind of exercise every day  Ask your healthcare provider what exercise is right for you  This may help reduce your cravings and reduce stress  Where can I find support and more information? · [employer]  Phone: 0- 871 - 359-0649  Web Address: www [employer]  CARE AGREEMENT:   You have the right to help plan your care  Learn about your health condition and how it may be treated  Discuss treatment options with your caregivers to decide what care you want to receive  You always have the right to refuse treatment  The above information is an  only  It is not intended as medical advice for individual conditions or treatments  Talk to your doctor, nurse or pharmacist before following any medical regimen to see if it is safe and effective for you    © 2017 [employer] [address] [place] [address] is for End User's use only and may not be sold, redistributed or otherwise used for commercial purposes  All illustrations and images included in CareNotes® are the copyrighted property of A [employer] M [employer]  or Rodrí[employer]ello

## 2018-02-21 NOTE — ANESTHESIA POSTPROCEDURE EVALUATION
Post-Op Assessment Note      CV Status:  Stable    Mental Status:  Alert and awake    Hydration Status:  Euvolemic    PONV Controlled:  Controlled    Airway Patency:  Patent    Post Op Vitals Reviewed: Yes          Staff: Anesthesiologist           BP (!) 86/52 (02/21/18 1229)    Temp      Pulse 84 (02/21/18 1229)   Resp 16 (02/21/18 1229)    SpO2 94 % (02/21/18 1229)

## 2018-03-30 ENCOUNTER — OFFICE VISIT (OUTPATIENT)
Dept: FAMILY MEDICINE CLINIC | Facility: CLINIC | Age: 23
End: 2018-03-30
Payer: COMMERCIAL

## 2018-03-30 VITALS
WEIGHT: 119.5 LBS | HEART RATE: 79 BPM | TEMPERATURE: 99.4 F | BODY MASS INDEX: 19.2 KG/M2 | DIASTOLIC BLOOD PRESSURE: 70 MMHG | OXYGEN SATURATION: 98 % | HEIGHT: 66 IN | SYSTOLIC BLOOD PRESSURE: 108 MMHG | RESPIRATION RATE: 16 BRPM

## 2018-03-30 DIAGNOSIS — F41.9 ANXIETY: ICD-10-CM

## 2018-03-30 DIAGNOSIS — R63.4 ABNORMAL WEIGHT LOSS: ICD-10-CM

## 2018-03-30 DIAGNOSIS — R63.0 LOSS OF APPETITE FOR MORE THAN 2 WEEKS: ICD-10-CM

## 2018-03-30 DIAGNOSIS — F32.A DEPRESSION, UNSPECIFIED DEPRESSION TYPE: Primary | ICD-10-CM

## 2018-03-30 PROBLEM — R19.5 LOOSE STOOLS: Status: RESOLVED | Noted: 2017-05-12 | Resolved: 2018-03-30

## 2018-03-30 PROBLEM — R68.81 EARLY SATIETY: Status: RESOLVED | Noted: 2017-12-01 | Resolved: 2018-03-30

## 2018-03-30 PROCEDURE — 99213 OFFICE O/P EST LOW 20 MIN: CPT | Performed by: PHYSICIAN ASSISTANT

## 2018-03-30 RX ORDER — ALPRAZOLAM 0.5 MG/1
0.5 TABLET ORAL DAILY PRN
Qty: 30 TABLET | Refills: 0 | Status: SHIPPED | OUTPATIENT
Start: 2018-03-30 | End: 2018-06-29 | Stop reason: SDUPTHER

## 2018-03-30 RX ORDER — PAROXETINE HYDROCHLORIDE 20 MG/1
20 TABLET, FILM COATED ORAL DAILY
Qty: 90 TABLET | Refills: 0
Start: 2018-03-30 | End: 2018-06-29 | Stop reason: SDUPTHER

## 2018-03-30 RX ORDER — PAROXETINE HYDROCHLORIDE 20 MG/1
30 TABLET, FILM COATED ORAL DAILY
Qty: 90 TABLET | Refills: 0
Start: 2018-03-30 | End: 2018-03-30 | Stop reason: SDUPTHER

## 2018-03-30 NOTE — ASSESSMENT & PLAN NOTE
Stable on cyproheptadine, weight stable that 119, will continue, can back off to bid to tid based hunger

## 2018-03-30 NOTE — ASSESSMENT & PLAN NOTE
Currently slightly unstable due to increased stress at school, heart classes  He is taking the Paxil 20 milligrams  He prefers to remain on the same dose and not change during his classes and will stick it out  He will call in a few months if he decides he wants to increase

## 2018-03-30 NOTE — ASSESSMENT & PLAN NOTE
Currently slightly unstable triggered by stressors at school with heart her classes  He is able to control as anxiety with going to the gym  I reminded him that he has Xanax as needed and he rarely takes that or tries to refrain from taking as much as possible  I gave him a printed prescription as he is near due for refill and told him he can certainly rely on it for those acute anxious moments
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
## 2018-05-18 ENCOUNTER — OFFICE VISIT (OUTPATIENT)
Dept: GASTROENTEROLOGY | Facility: MEDICAL CENTER | Age: 23
End: 2018-05-18
Payer: COMMERCIAL

## 2018-05-18 VITALS
HEIGHT: 66 IN | BODY MASS INDEX: 19.29 KG/M2 | TEMPERATURE: 97.9 F | HEART RATE: 80 BPM | WEIGHT: 120 LBS | DIASTOLIC BLOOD PRESSURE: 72 MMHG | SYSTOLIC BLOOD PRESSURE: 118 MMHG

## 2018-05-18 DIAGNOSIS — R63.0 LOSS OF APPETITE FOR MORE THAN 2 WEEKS: ICD-10-CM

## 2018-05-18 DIAGNOSIS — R63.4 ABNORMAL WEIGHT LOSS: Primary | ICD-10-CM

## 2018-05-18 DIAGNOSIS — K58.0 IRRITABLE BOWEL SYNDROME WITH DIARRHEA: ICD-10-CM

## 2018-05-18 PROCEDURE — 99214 OFFICE O/P EST MOD 30 MIN: CPT | Performed by: INTERNAL MEDICINE
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
## 2018-06-29 DIAGNOSIS — F32.A DEPRESSION, UNSPECIFIED DEPRESSION TYPE: ICD-10-CM

## 2018-06-29 DIAGNOSIS — R63.4 ABNORMAL WEIGHT LOSS: Primary | ICD-10-CM

## 2018-06-29 DIAGNOSIS — F41.9 ANXIETY: ICD-10-CM

## 2018-07-02 RX ORDER — ALPRAZOLAM 0.5 MG/1
0.5 TABLET ORAL DAILY PRN
Qty: 30 TABLET | Refills: 0 | Status: SHIPPED | OUTPATIENT
Start: 2018-07-02 | End: 2018-07-31 | Stop reason: SDUPTHER

## 2018-07-02 RX ORDER — CYPROHEPTADINE HYDROCHLORIDE 4 MG/1
4 TABLET ORAL 4 TIMES DAILY
Qty: 30 TABLET | Refills: 0 | Status: SHIPPED | OUTPATIENT
Start: 2018-07-02 | End: 2018-09-13 | Stop reason: SDUPTHER

## 2018-07-02 RX ORDER — PAROXETINE HYDROCHLORIDE 20 MG/1
20 TABLET, FILM COATED ORAL DAILY
Qty: 90 TABLET | Refills: 1 | Status: SHIPPED | OUTPATIENT
Start: 2018-07-02 | End: 2019-01-06 | Stop reason: SDUPTHER

## 2018-07-31 ENCOUNTER — OFFICE VISIT (OUTPATIENT)
Dept: FAMILY MEDICINE CLINIC | Facility: CLINIC | Age: 23
End: 2018-07-31
Payer: COMMERCIAL

## 2018-07-31 VITALS
OXYGEN SATURATION: 99 % | HEIGHT: 67 IN | BODY MASS INDEX: 19.12 KG/M2 | HEART RATE: 81 BPM | DIASTOLIC BLOOD PRESSURE: 82 MMHG | SYSTOLIC BLOOD PRESSURE: 122 MMHG | TEMPERATURE: 98 F | WEIGHT: 121.8 LBS

## 2018-07-31 DIAGNOSIS — F41.9 ANXIETY: ICD-10-CM

## 2018-07-31 DIAGNOSIS — F32.A DEPRESSION, UNSPECIFIED DEPRESSION TYPE: Primary | ICD-10-CM

## 2018-07-31 DIAGNOSIS — R63.0 LOSS OF APPETITE FOR MORE THAN 2 WEEKS: ICD-10-CM

## 2018-07-31 PROCEDURE — 99213 OFFICE O/P EST LOW 20 MIN: CPT | Performed by: PHYSICIAN ASSISTANT

## 2018-07-31 RX ORDER — ALPRAZOLAM 0.5 MG/1
0.5 TABLET ORAL DAILY PRN
Qty: 30 TABLET | Refills: 0 | Status: SHIPPED | OUTPATIENT
Start: 2018-07-31 | End: 2018-10-29 | Stop reason: SDUPTHER

## 2018-07-31 NOTE — PROGRESS NOTES
Assessment/Plan:    Loss of appetite for more than 2 weeks  Doing well on cyproheptadine 2-3 x daily  Gained about 20lbs since starting it may 2017  Will continue to monitor  Depression  Stable on paxil daily  Anxiety  Stable on paxil daily and xanax prn  Printed script given today for xanax refill, PDMP checked  Diagnoses and all orders for this visit:    Depression, unspecified depression type    Anxiety  -     ALPRAZolam (XANAX) 0 5 mg tablet; Take 1 tablet (0 5 mg total) by mouth daily as needed for anxiety    Loss of appetite for more than 2 weeks        34-year-old male presenting today for routine follow-up for appetite suppression and anxiety /depression  His anxiety and depression has been stable on Paxil daily with Xanax as needed  He has been doing well on the Cyproheptadine for appetite eating improvement and he is eating 3 meals a day  He is going to the gym and states this is the best he has ever felt  He has gained 20 lb since starting cyproheptadine May of 2017  I will continue him on all current medications as directed with continue monitoring  He will follow up in 1 year  Chief Complaint   Patient presents with    Follow-up     4 months       Subjective:      Patient ID: Camron Valdovinos is a 24 y o  male  24y/o male here today for f/u depression/anxiety and suppressed appetite  Started joining gym and doing very well  Eating regularly, no suppressed appetite  He is taking cyproheptadine 2-3 x daily  Stress level is down that he is on summer break from school  He has been using the xanax prn, using mostly at night during feeling panicking  During day he is fine  Also paxil daily  The following portions of the patient's history were reviewed and updated as appropriate: allergies, current medications, past family history, past medical history, past social history, past surgical history and problem list     Review of Systems   Constitutional: Negative  Respiratory: Negative  Cardiovascular: Negative  Gastrointestinal: Negative  Genitourinary: Negative  Neurological: Negative  Psychiatric/Behavioral: Negative  Objective:      /82 (BP Location: Left arm, Patient Position: Sitting)   Pulse 81   Temp 98 °F (36 7 °C) (Tympanic)   Ht 5' 7" (1 702 m)   Wt 55 2 kg (121 lb 12 8 oz)   SpO2 99%   BMI 19 08 kg/m²          Physical Exam   Constitutional: He is oriented to person, place, and time  He appears well-developed and well-nourished  Neck: Neck supple  Cardiovascular: Normal rate, regular rhythm, normal heart sounds and intact distal pulses  Pulmonary/Chest: Effort normal and breath sounds normal    Abdominal: Soft  Bowel sounds are normal    Lymphadenopathy:     He has no cervical adenopathy  Neurological: He is alert and oriented to person, place, and time  Psychiatric: He has a normal mood and affect  Vitals reviewed

## 2018-07-31 NOTE — ASSESSMENT & PLAN NOTE
Doing well on cyproheptadine 2-3 x daily  Gained about 20lbs since starting it may 2017  Will continue to monitor

## 2018-09-13 DIAGNOSIS — R63.4 ABNORMAL WEIGHT LOSS: ICD-10-CM

## 2018-09-13 RX ORDER — CYPROHEPTADINE HYDROCHLORIDE 4 MG/1
4 TABLET ORAL 4 TIMES DAILY
Qty: 120 TABLET | Refills: 3 | Status: SHIPPED | OUTPATIENT
Start: 2018-09-13 | End: 2019-04-11 | Stop reason: SDUPTHER

## 2018-10-29 ENCOUNTER — OFFICE VISIT (OUTPATIENT)
Dept: FAMILY MEDICINE CLINIC | Facility: CLINIC | Age: 23
End: 2018-10-29
Payer: COMMERCIAL

## 2018-10-29 VITALS
DIASTOLIC BLOOD PRESSURE: 76 MMHG | RESPIRATION RATE: 16 BRPM | HEIGHT: 67 IN | TEMPERATURE: 98.4 F | OXYGEN SATURATION: 98 % | SYSTOLIC BLOOD PRESSURE: 120 MMHG | WEIGHT: 123.9 LBS | HEART RATE: 81 BPM | BODY MASS INDEX: 19.45 KG/M2

## 2018-10-29 DIAGNOSIS — F41.9 ANXIETY: ICD-10-CM

## 2018-10-29 DIAGNOSIS — Z23 NEED FOR TDAP VACCINATION: ICD-10-CM

## 2018-10-29 DIAGNOSIS — Z23 NEEDS FLU SHOT: ICD-10-CM

## 2018-10-29 DIAGNOSIS — Z00.00 ROUTINE ADULT HEALTH MAINTENANCE: Primary | ICD-10-CM

## 2018-10-29 DIAGNOSIS — Z23 NEED FOR MENINGITIS VACCINATION: ICD-10-CM

## 2018-10-29 PROCEDURE — 99395 PREV VISIT EST AGE 18-39: CPT | Performed by: PHYSICIAN ASSISTANT

## 2018-10-29 PROCEDURE — 90682 RIV4 VACC RECOMBINANT DNA IM: CPT | Performed by: PHYSICIAN ASSISTANT

## 2018-10-29 PROCEDURE — 90471 IMMUNIZATION ADMIN: CPT | Performed by: PHYSICIAN ASSISTANT

## 2018-10-29 PROCEDURE — 90715 TDAP VACCINE 7 YRS/> IM: CPT | Performed by: PHYSICIAN ASSISTANT

## 2018-10-29 PROCEDURE — 90734 MENACWYD/MENACWYCRM VACC IM: CPT | Performed by: PHYSICIAN ASSISTANT

## 2018-10-29 PROCEDURE — 90472 IMMUNIZATION ADMIN EACH ADD: CPT | Performed by: PHYSICIAN ASSISTANT

## 2018-10-29 RX ORDER — ALPRAZOLAM 0.5 MG/1
0.5 TABLET ORAL DAILY PRN
Qty: 30 TABLET | Refills: 0 | Status: SHIPPED | OUTPATIENT
Start: 2018-10-29 | End: 2019-01-07 | Stop reason: SDUPTHER

## 2018-10-29 NOTE — PROGRESS NOTES
Assessment/Plan:      Diagnoses and all orders for this visit:    Routine adult health maintenance    Anxiety  -     ALPRAZolam (XANAX) 0 5 mg tablet; Take 1 tablet (0 5 mg total) by mouth daily as needed for anxiety    Needs flu shot  -     influenza vaccine, 5564-4148, quadrivalent, recombinant, PF, 0 5 mL, for patients 18-49 yr with comorbidities (FLUBLOK)    Need for Tdap vaccination  -     TDAP VACCINE GREATER THAN OR EQUAL TO 8YO IM    Need for meningitis vaccination  -     MENINGOCOCCAL CONJUGATE VACCINE MCV4P IM        Patient is a 77-year-old male presenting today for routine adult health maintenance  He will be transferring to [employer] to continue his undergraduate degree from [employer]  He overall is in good health and his medical conditions are currently stable  Xanax refilled today  His exam is unremarkable and he has no new concerns or changes to medical treatment  Age-appropriate education discussed today  Patient is due for immunizations including his Tdap and flu vaccine which were administered today  Because he will now be living on campus and he only had 1 documented meningitis immunization in 2005,  I have advised that we administered and updated Menactra  I also discussed with him the new meningitis B immunization which he will consider in the near future and he can schedule a separate nurse appointment for this  Chief Complaint   Patient presents with    Physical Exam     College       Subjective:     Patient ID: Toya Donovan is a 24 y o  male  22y/o male here today for annual physical  No changes to health since seen last  No recent illness or hospitalizations  Not UTD with dental cleanings  Brushes teeth daily  No changes to vision  Does not need correction  Well balanced diet  He does not take vitamins  Drinks coffee water and protein shakes, occasional energy drinks  Goes to gym about 2-3 x a week        He has not been sexually active  Last sexual encounter 3 years ago  No urinary sxs  Wears seatbelt in car  Smoke detectors in home  Review of Systems   Constitutional: Negative  HENT: Negative  Respiratory: Negative  Cardiovascular: Negative  Gastrointestinal: Negative  Genitourinary: Negative  Musculoskeletal: Negative  Skin: Negative  Neurological: Negative  Hematological: Negative  Psychiatric/Behavioral: Negative  The following portions of the patient's history were reviewed and updated as appropriate: allergies, current medications, past family history, past medical history, past social history, past surgical history and problem list       Objective:     Physical Exam   Constitutional: He is oriented to person, place, and time  Vital signs are normal  He appears well-developed and well-nourished  He does not appear ill  No distress  HENT:   Head: Normocephalic  Right Ear: Hearing, tympanic membrane and ear canal normal    Left Ear: Hearing, tympanic membrane and ear canal normal    Nose: Nose normal    Mouth/Throat: Oropharynx is clear and moist    Eyes: Pupils are equal, round, and reactive to light  Conjunctivae, EOM and lids are normal    Neck: Trachea normal and normal range of motion  Neck supple  Normal carotid pulses present  No thyroid mass present  Cardiovascular: Normal rate, regular rhythm, S1 normal, S2 normal and normal pulses  No murmur heard  Pulmonary/Chest: Effort normal and breath sounds normal    Abdominal: Soft  Normal appearance, normal aorta and bowel sounds are normal  There is no tenderness  Musculoskeletal:   Gait normal    Lymphadenopathy:     He has no cervical adenopathy  Neurological: He is alert and oriented to person, place, and time  No cranial nerve deficit or sensory deficit  Reflex Scores:       Brachioradialis reflexes are 1+ on the right side and 1+ on the left side         Patellar reflexes are 1+ on the right side and 1+ on the left side  Skin: Skin is intact  Psychiatric: He has a normal mood and affect  His behavior is normal  Cognition and memory are normal    Vitals reviewed        Vitals:    10/29/18 1654   BP: 120/76   BP Location: Left arm   Patient Position: Sitting   Cuff Size: Standard   Pulse: 81   Resp: 16   Temp: 98 4 °F (36 9 °C)   TempSrc: Tympanic   SpO2: 98%   Weight: 56 2 kg (123 lb 14 4 oz)   Height: 5' 7" (1 702 m)

## 2019-01-06 DIAGNOSIS — F41.9 ANXIETY: ICD-10-CM

## 2019-01-06 DIAGNOSIS — F32.A DEPRESSION, UNSPECIFIED DEPRESSION TYPE: ICD-10-CM

## 2019-01-06 RX ORDER — PAROXETINE HYDROCHLORIDE 20 MG/1
TABLET, FILM COATED ORAL
Qty: 90 TABLET | Refills: 1 | Status: SHIPPED | OUTPATIENT
Start: 2019-01-06 | End: 2019-04-11

## 2019-01-07 DIAGNOSIS — F41.9 ANXIETY: ICD-10-CM

## 2019-01-08 RX ORDER — ALPRAZOLAM 0.5 MG/1
0.5 TABLET ORAL DAILY PRN
Qty: 30 TABLET | Refills: 0 | Status: SHIPPED | OUTPATIENT
Start: 2019-01-08 | End: 2019-04-11 | Stop reason: SDUPTHER

## 2019-01-08 NOTE — TELEPHONE ENCOUNTER
Rx for Xanax 0 5 mg was printed instead of being sent to pharmacy  Pt requested Rx to be sent to pharmacy  I left message with Rx details on [facility] [facility]

## 2019-03-13 ENCOUNTER — DOCUMENTATION (OUTPATIENT)
Dept: FAMILY MEDICINE CLINIC | Facility: CLINIC | Age: 24
End: 2019-03-13

## 2019-03-13 NOTE — PROGRESS NOTES
NO AUTH NEEDED, $25 00 COPAY, UNLIMITED VISITS, CLAIMS TO [facility], PHONE #[phone], CALL REF # [id number]

## 2019-04-11 ENCOUNTER — OFFICE VISIT (OUTPATIENT)
Dept: FAMILY MEDICINE CLINIC | Facility: CLINIC | Age: 24
End: 2019-04-11
Payer: COMMERCIAL

## 2019-04-11 VITALS
DIASTOLIC BLOOD PRESSURE: 80 MMHG | RESPIRATION RATE: 18 BRPM | HEART RATE: 79 BPM | OXYGEN SATURATION: 98 % | BODY MASS INDEX: 19.43 KG/M2 | TEMPERATURE: 98.7 F | SYSTOLIC BLOOD PRESSURE: 140 MMHG | HEIGHT: 66 IN | WEIGHT: 120.9 LBS

## 2019-04-11 DIAGNOSIS — F41.9 ANXIETY: ICD-10-CM

## 2019-04-11 DIAGNOSIS — R63.4 ABNORMAL WEIGHT LOSS: ICD-10-CM

## 2019-04-11 DIAGNOSIS — F33.1 MODERATE EPISODE OF RECURRENT MAJOR DEPRESSIVE DISORDER (HCC): Primary | ICD-10-CM

## 2019-04-11 PROBLEM — F33.9 EPISODE OF RECURRENT MAJOR DEPRESSIVE DISORDER (HCC): Status: ACTIVE | Noted: 2017-05-12

## 2019-04-11 PROCEDURE — 3008F BODY MASS INDEX DOCD: CPT | Performed by: PHYSICIAN ASSISTANT

## 2019-04-11 PROCEDURE — 99214 OFFICE O/P EST MOD 30 MIN: CPT | Performed by: PHYSICIAN ASSISTANT

## 2019-04-11 PROCEDURE — 1036F TOBACCO NON-USER: CPT | Performed by: PHYSICIAN ASSISTANT

## 2019-04-11 RX ORDER — PAROXETINE 10 MG/1
10 TABLET, FILM COATED ORAL DAILY
Qty: 30 TABLET | Refills: 0
Start: 2019-04-11 | End: 2019-04-25

## 2019-04-11 RX ORDER — ALPRAZOLAM 0.5 MG/1
0.5 TABLET ORAL DAILY PRN
Qty: 30 TABLET | Refills: 0 | Status: SHIPPED | OUTPATIENT
Start: 2019-04-11 | End: 2019-05-26 | Stop reason: SDUPTHER

## 2019-04-11 RX ORDER — CYPROHEPTADINE HYDROCHLORIDE 4 MG/1
4 TABLET ORAL 2 TIMES DAILY PRN
Qty: 60 TABLET | Refills: 3
Start: 2019-04-11 | End: 2019-08-05 | Stop reason: SDUPTHER

## 2019-04-12 ENCOUNTER — TELEPHONE (OUTPATIENT)
Dept: PSYCHIATRY | Facility: CLINIC | Age: 24
End: 2019-04-12

## 2019-04-15 ENCOUNTER — OFFICE VISIT (OUTPATIENT)
Dept: BEHAVIORAL/MENTAL HEALTH CLINIC | Facility: CLINIC | Age: 24
End: 2019-04-15
Payer: COMMERCIAL

## 2019-04-15 DIAGNOSIS — F33.2 SEVERE RECURRENT MAJOR DEPRESSION WITHOUT PSYCHOTIC FEATURES (HCC): Primary | ICD-10-CM

## 2019-04-15 DIAGNOSIS — F11.21 OPIOID DEPENDENCE IN REMISSION (HCC): ICD-10-CM

## 2019-04-15 DIAGNOSIS — F19.20: ICD-10-CM

## 2019-04-15 DIAGNOSIS — F33.2 SEVERE EPISODE OF RECURRENT MAJOR DEPRESSIVE DISORDER, WITHOUT PSYCHOTIC FEATURES (HCC): ICD-10-CM

## 2019-04-15 DIAGNOSIS — F11.20 OPIOID DEPENDENCE ON MAINTENANCE AGONIST THERAPY, NO SYMPTOMS (HCC): ICD-10-CM

## 2019-04-15 DIAGNOSIS — F42.2 MIXED OBSESSIONAL THOUGHTS AND ACTS: ICD-10-CM

## 2019-04-15 PROCEDURE — 90791 PSYCH DIAGNOSTIC EVALUATION: CPT | Performed by: SOCIAL WORKER

## 2019-04-22 ENCOUNTER — TELEPHONE (OUTPATIENT)
Dept: PSYCHIATRY | Facility: CLINIC | Age: 24
End: 2019-04-22

## 2019-04-25 ENCOUNTER — OFFICE VISIT (OUTPATIENT)
Dept: FAMILY MEDICINE CLINIC | Facility: CLINIC | Age: 24
End: 2019-04-25
Payer: COMMERCIAL

## 2019-04-25 VITALS
OXYGEN SATURATION: 97 % | RESPIRATION RATE: 17 BRPM | WEIGHT: 115.2 LBS | HEART RATE: 103 BPM | BODY MASS INDEX: 18.08 KG/M2 | HEIGHT: 67 IN | TEMPERATURE: 99 F | SYSTOLIC BLOOD PRESSURE: 112 MMHG | DIASTOLIC BLOOD PRESSURE: 72 MMHG

## 2019-04-25 DIAGNOSIS — F33.1 MODERATE EPISODE OF RECURRENT MAJOR DEPRESSIVE DISORDER (HCC): Primary | ICD-10-CM

## 2019-04-25 DIAGNOSIS — F42.9 OBSESSIVE-COMPULSIVE DISORDER, UNSPECIFIED TYPE: ICD-10-CM

## 2019-04-25 DIAGNOSIS — F41.9 ANXIETY: ICD-10-CM

## 2019-04-25 PROCEDURE — 99213 OFFICE O/P EST LOW 20 MIN: CPT | Performed by: PHYSICIAN ASSISTANT

## 2019-05-26 DIAGNOSIS — F41.9 ANXIETY: ICD-10-CM

## 2019-05-28 RX ORDER — ALPRAZOLAM 0.5 MG/1
0.5 TABLET ORAL DAILY PRN
Qty: 30 TABLET | Refills: 0 | Status: SHIPPED | OUTPATIENT
Start: 2019-05-28 | End: 2019-08-05 | Stop reason: SDUPTHER

## 2019-05-29 DIAGNOSIS — F33.1 MODERATE EPISODE OF RECURRENT MAJOR DEPRESSIVE DISORDER (HCC): ICD-10-CM

## 2019-05-29 DIAGNOSIS — F41.9 ANXIETY: ICD-10-CM

## 2019-06-21 ENCOUNTER — OFFICE VISIT (OUTPATIENT)
Dept: BEHAVIORAL/MENTAL HEALTH CLINIC | Facility: CLINIC | Age: 24
End: 2019-06-21
Payer: COMMERCIAL

## 2019-06-21 DIAGNOSIS — F42.9 OBSESSIVE-COMPULSIVE DISORDER, UNSPECIFIED TYPE: ICD-10-CM

## 2019-06-21 DIAGNOSIS — F33.1 MODERATE EPISODE OF RECURRENT MAJOR DEPRESSIVE DISORDER (HCC): Primary | ICD-10-CM

## 2019-06-21 DIAGNOSIS — F41.9 ANXIETY: ICD-10-CM

## 2019-06-21 PROCEDURE — 90834 PSYTX W PT 45 MINUTES: CPT | Performed by: SOCIAL WORKER

## 2019-06-25 ENCOUNTER — OFFICE VISIT (OUTPATIENT)
Dept: FAMILY MEDICINE CLINIC | Facility: CLINIC | Age: 24
End: 2019-06-25
Payer: COMMERCIAL

## 2019-06-25 VITALS
RESPIRATION RATE: 16 BRPM | HEART RATE: 89 BPM | SYSTOLIC BLOOD PRESSURE: 114 MMHG | WEIGHT: 113.8 LBS | OXYGEN SATURATION: 97 % | DIASTOLIC BLOOD PRESSURE: 80 MMHG | TEMPERATURE: 98.2 F | BODY MASS INDEX: 17.86 KG/M2 | HEIGHT: 67 IN

## 2019-06-25 DIAGNOSIS — F33.1 MODERATE EPISODE OF RECURRENT MAJOR DEPRESSIVE DISORDER (HCC): Primary | ICD-10-CM

## 2019-06-25 PROCEDURE — 99213 OFFICE O/P EST LOW 20 MIN: CPT | Performed by: FAMILY MEDICINE

## 2019-06-27 ENCOUNTER — OFFICE VISIT (OUTPATIENT)
Dept: PSYCHIATRY | Facility: CLINIC | Age: 24
End: 2019-06-27
Payer: COMMERCIAL

## 2019-06-27 VITALS
SYSTOLIC BLOOD PRESSURE: 143 MMHG | BODY MASS INDEX: 18.18 KG/M2 | HEIGHT: 66 IN | WEIGHT: 113.1 LBS | DIASTOLIC BLOOD PRESSURE: 78 MMHG | HEART RATE: 76 BPM

## 2019-06-27 DIAGNOSIS — F42.2 MIXED OBSESSIONAL THOUGHTS AND ACTS: ICD-10-CM

## 2019-06-27 DIAGNOSIS — F33.1 MODERATE EPISODE OF RECURRENT MAJOR DEPRESSIVE DISORDER (HCC): Primary | ICD-10-CM

## 2019-06-27 PROCEDURE — 90792 PSYCH DIAG EVAL W/MED SRVCS: CPT | Performed by: NURSE PRACTITIONER

## 2019-08-05 DIAGNOSIS — R63.4 ABNORMAL WEIGHT LOSS: ICD-10-CM

## 2019-08-05 DIAGNOSIS — F41.9 ANXIETY: ICD-10-CM

## 2019-08-05 RX ORDER — ALPRAZOLAM 0.5 MG/1
0.5 TABLET ORAL DAILY PRN
Qty: 30 TABLET | Refills: 0 | Status: SHIPPED | OUTPATIENT
Start: 2019-08-05 | End: 2019-11-27 | Stop reason: SDUPTHER

## 2019-08-05 RX ORDER — CYPROHEPTADINE HYDROCHLORIDE 4 MG/1
4 TABLET ORAL 2 TIMES DAILY PRN
Qty: 60 TABLET | Refills: 3 | Status: SHIPPED | OUTPATIENT
Start: 2019-08-05 | End: 2019-11-27 | Stop reason: SDUPTHER

## 2019-08-30 ENCOUNTER — OFFICE VISIT (OUTPATIENT)
Dept: FAMILY MEDICINE CLINIC | Facility: CLINIC | Age: 24
End: 2019-08-30
Payer: COMMERCIAL

## 2019-08-30 VITALS
TEMPERATURE: 97.7 F | SYSTOLIC BLOOD PRESSURE: 126 MMHG | RESPIRATION RATE: 16 BRPM | BODY MASS INDEX: 18.58 KG/M2 | OXYGEN SATURATION: 97 % | DIASTOLIC BLOOD PRESSURE: 78 MMHG | HEART RATE: 101 BPM | WEIGHT: 118.4 LBS | HEIGHT: 67 IN

## 2019-08-30 DIAGNOSIS — F41.9 ANXIETY: ICD-10-CM

## 2019-08-30 DIAGNOSIS — F33.1 MODERATE EPISODE OF RECURRENT MAJOR DEPRESSIVE DISORDER (HCC): Primary | ICD-10-CM

## 2019-08-30 PROCEDURE — 99214 OFFICE O/P EST MOD 30 MIN: CPT | Performed by: FAMILY MEDICINE

## 2019-08-30 PROCEDURE — 3008F BODY MASS INDEX DOCD: CPT | Performed by: FAMILY MEDICINE

## 2019-08-30 NOTE — PROGRESS NOTES
Assessment/Plan:   1  Moderate episode of recurrent major depressive disorder ([facility] [address] )  Reviewed patient's symptoms today  At this time, he has noticed mild worsening of depression  He was encouraged to continue with a strict diet and exercise plan  He may highly benefit from seeing a psychologist   He was advised when his activity specially with school settles he may benefit from seeing a psychologist   Will increase his dose of Zoloft to 75 mg daily  Follow up patient over his winter break  - sertraline (ZOLOFT) 50 mg tablet; Take 1 5 tablets (75 mg total) by mouth daily  Dispense: 90 tablet; Refill: 1         There are no diagnoses linked to this encounter  Subjective:    Chief Complaint   Patient presents with    Follow-up    Depression    Anxiety        Patient ID: Ramirez Butler is a 21 y o  male  Patient is a 79-year-old male presents today for follow-up on his chronic conditions  He has major depressive disorder  He states that he recently started back at [employer]  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  He states that he has been taking his Zoloft regularly  He denies adverse reactions medications  He does not believe that this medication is working completely effectively for him  He would like to consider adjusting the dose  He does take his alprazolam every other day for symptom he denies any SI or HI today  Review of Systems   Constitutional: Negative for activity change, chills, fatigue and fever  HENT: Negative for congestion, ear pain, sinus pressure and sore throat  Eyes: Negative for redness, itching and visual disturbance  Respiratory: Negative for cough and shortness of breath  Cardiovascular: Negative for chest pain and palpitations  Gastrointestinal: Negative for abdominal pain, diarrhea and nausea  Endocrine: Negative for cold intolerance and heat intolerance  Genitourinary: Negative for dysuria, flank pain and frequency  Musculoskeletal: Negative for arthralgias, back pain, gait problem and myalgias  Skin: Negative for color change  Allergic/Immunologic: Negative for environmental allergies  Neurological: Negative for dizziness, numbness and headaches  Psychiatric/Behavioral: Negative for behavioral problems and sleep disturbance  The following portions of the patient's history were reviewed and updated as appropriate : past family history, past medical history, past social history and past surgical history  Current Outpatient Medications:     ALPRAZolam (XANAX) 0 5 mg tablet, Take 1 tablet (0 5 mg total) by mouth daily as needed for anxiety, Disp: 30 tablet, Rfl: 0    cyproheptadine (PERIACTIN) 4 mg tablet, Take 1 tablet (4 mg total) by mouth 2 (two) times a day as needed for allergies, Disp: 60 tablet, Rfl: 3    sertraline (ZOLOFT) 50 mg tablet, Take 1 tablet (50 mg total) by mouth daily, Disp: 90 tablet, Rfl: 1    Objective:    Vitals:    08/30/19 0852   BP: 126/78   BP Location: Left arm   Patient Position: Sitting   Pulse: 101   Resp: 16   Temp: 97 7 °F (36 5 °C)   TempSrc: Tympanic   SpO2: 97%   Weight: 53 7 kg (118 lb 6 4 oz)   Height: 5' 7" (1 702 m)        Physical Exam   Constitutional: He is oriented to person, place, and time  He appears well-developed and well-nourished  HENT:   Head: Normocephalic and atraumatic  Nose: Nose normal    Mouth/Throat: No oropharyngeal exudate  Eyes: Pupils are equal, round, and reactive to light  Right eye exhibits no discharge  Left eye exhibits no discharge  Neck: Normal range of motion  Neck supple  No tracheal deviation present  Cardiovascular: Normal rate, regular rhythm and intact distal pulses  Exam reveals no gallop and no friction rub  No murmur heard  Pulses:       Dorsalis pedis pulses are 2+ on the right side, and 2+ on the left side  Posterior tibial pulses are 2+ on the right side, and 2+ on the left side     Pulmonary/Chest: Effort normal and breath sounds normal  No respiratory distress  He has no wheezes  He has no rales  Abdominal: Soft  Bowel sounds are normal  He exhibits no distension  There is no tenderness  There is no rebound and no guarding  Musculoskeletal: Normal range of motion  He exhibits no edema  Lymphadenopathy:        Head (right side): No submental and no submandibular adenopathy present  Head (left side): No submental and no submandibular adenopathy present  He has no cervical adenopathy  Right cervical: No superficial cervical, no deep cervical and no posterior cervical adenopathy present  Left cervical: No superficial cervical, no deep cervical and no posterior cervical adenopathy present  Neurological: He is alert and oriented to person, place, and time  No cranial nerve deficit or sensory deficit  Skin: Skin is warm, dry and intact  Psychiatric: His speech is normal and behavior is normal  Judgment normal  His mood appears not anxious  Cognition and memory are normal  He does not exhibit a depressed mood  Vitals reviewed

## 2019-11-27 DIAGNOSIS — F41.9 ANXIETY: ICD-10-CM

## 2019-11-27 DIAGNOSIS — R63.4 ABNORMAL WEIGHT LOSS: ICD-10-CM

## 2019-11-30 RX ORDER — CYPROHEPTADINE HYDROCHLORIDE 4 MG/1
4 TABLET ORAL 2 TIMES DAILY PRN
Qty: 60 TABLET | Refills: 3 | Status: SHIPPED | OUTPATIENT
Start: 2019-11-30 | End: 2020-09-03 | Stop reason: SDUPTHER

## 2019-11-30 RX ORDER — ALPRAZOLAM 0.5 MG/1
0.5 TABLET ORAL DAILY PRN
Qty: 30 TABLET | Refills: 0 | Status: SHIPPED | OUTPATIENT
Start: 2019-11-30 | End: 2020-04-02 | Stop reason: SDUPTHER

## 2019-12-30 ENCOUNTER — OFFICE VISIT (OUTPATIENT)
Dept: FAMILY MEDICINE CLINIC | Facility: CLINIC | Age: 24
End: 2019-12-30
Payer: COMMERCIAL

## 2019-12-30 VITALS
TEMPERATURE: 97.1 F | OXYGEN SATURATION: 99 % | DIASTOLIC BLOOD PRESSURE: 70 MMHG | BODY MASS INDEX: 19.89 KG/M2 | SYSTOLIC BLOOD PRESSURE: 110 MMHG | HEART RATE: 72 BPM | WEIGHT: 123.8 LBS | HEIGHT: 66 IN

## 2019-12-30 DIAGNOSIS — F33.1 MODERATE EPISODE OF RECURRENT MAJOR DEPRESSIVE DISORDER (HCC): ICD-10-CM

## 2019-12-30 DIAGNOSIS — Z23 NEED FOR VACCINATION: Primary | ICD-10-CM

## 2019-12-30 PROCEDURE — 90686 IIV4 VACC NO PRSV 0.5 ML IM: CPT | Performed by: FAMILY MEDICINE

## 2019-12-30 PROCEDURE — 99213 OFFICE O/P EST LOW 20 MIN: CPT | Performed by: FAMILY MEDICINE

## 2019-12-30 PROCEDURE — 90471 IMMUNIZATION ADMIN: CPT | Performed by: FAMILY MEDICINE

## 2019-12-30 PROCEDURE — 3008F BODY MASS INDEX DOCD: CPT | Performed by: FAMILY MEDICINE

## 2019-12-30 NOTE — PROGRESS NOTES
Assessment/Plan:   1  Moderate episode of recurrent major depressive disorder (HCC)  Patient's mood appears very well controlled today  At this time, he was encouraged to continue with his stress relieving techniques/tactics at home  Continue with regular exercise  Will continue as well with his current treatment of Zoloft 75 mg daily  Will continue with this dose  He may use his alprazolam p r n  For symptom relief  Follow up with patient in 6 months  2  Need for vaccination  - influenza vaccine, [id number], quadrivalent, 0 5 mL, preservative-free, for adult and pediatric patients 6 mos+ (AFLURIA, [address], [address] [facility])           Diagnoses and all orders for this visit:    Need for vaccination  -     influenza vaccine, [id number], quadrivalent, 0 5 mL, preservative-free, for adult and pediatric patients 6 mos+ (AFLURIA, FLUARIX, FLULAVAL, FLUZONE)          Subjective:       Chief Complaint   Patient presents with    Follow-up     4 month check      Patient ID: Patricia Forman is a 21 y o  male  Patient is a 48-year-old male presents today for follow-up on his major depressive disorder  Since his last visit, he states that his increased dose of Zoloft appears to be effective for him  He has been taking his medication regularly  He denies adverse reactions with his medication  He states that he takes his alprazolam every few days for symptom relief  He denies any SI or HI today  He states that he has it exercising regularly as well  Review of Systems   Constitutional: Negative for activity change, chills, fatigue and fever  HENT: Negative for congestion, ear pain, sinus pressure and sore throat  Eyes: Negative for redness, itching and visual disturbance  Respiratory: Negative for cough and shortness of breath  Cardiovascular: Negative for chest pain and palpitations  Gastrointestinal: Negative for abdominal pain, diarrhea and nausea     Endocrine: Negative for cold intolerance and heat intolerance  Genitourinary: Negative for dysuria, flank pain and frequency  Musculoskeletal: Negative for arthralgias, back pain, gait problem and myalgias  Skin: Negative for color change  Allergic/Immunologic: Negative for environmental allergies  Neurological: Negative for dizziness, numbness and headaches  Psychiatric/Behavioral: Negative for behavioral problems and sleep disturbance  The following portions of the patient's history were reviewed and updated as appropriate : past family history, past medical history, past social history and past surgical history  Current Outpatient Medications:     ALPRAZolam (XANAX) 0 5 mg tablet, Take 1 tablet (0 5 mg total) by mouth daily as needed for anxiety, Disp: 30 tablet, Rfl: 0    cyproheptadine (PERIACTIN) 4 mg tablet, Take 1 tablet (4 mg total) by mouth 2 (two) times a day as needed for allergies, Disp: 60 tablet, Rfl: 3    sertraline (ZOLOFT) 50 mg tablet, Take 1 5 tablets (75 mg total) by mouth daily, Disp: 90 tablet, Rfl: 1         Objective:         Vitals:    12/30/19 1003   BP: 110/70   BP Location: Left arm   Patient Position: Sitting   Cuff Size: Adult   Pulse: 72   Temp: (!) 97 1 °F (36 2 °C)   TempSrc: Tympanic   SpO2: 99%   Weight: 56 2 kg (123 lb 12 8 oz)   Height: 5' 6 14" (1 68 m)     Physical Exam   Constitutional: He is oriented to person, place, and time  He appears well-developed and well-nourished  HENT:   Head: Normocephalic and atraumatic  Nose: Nose normal    Mouth/Throat: No oropharyngeal exudate  Eyes: Pupils are equal, round, and reactive to light  Right eye exhibits no discharge  Left eye exhibits no discharge  Neck: Normal range of motion  Neck supple  No tracheal deviation present  Cardiovascular: Normal rate, regular rhythm and intact distal pulses  Exam reveals no gallop and no friction rub  No murmur heard    Pulses:       Dorsalis pedis pulses are 2+ on the right side, and 2+ on the left side  Posterior tibial pulses are 2+ on the right side, and 2+ on the left side  Pulmonary/Chest: Effort normal and breath sounds normal  No respiratory distress  He has no wheezes  He has no rales  Abdominal: Soft  Bowel sounds are normal  He exhibits no distension  There is no tenderness  There is no rebound and no guarding  Musculoskeletal: Normal range of motion  He exhibits no edema  Lymphadenopathy:        Head (right side): No submental and no submandibular adenopathy present  Head (left side): No submental and no submandibular adenopathy present  He has no cervical adenopathy  Right cervical: No superficial cervical, no deep cervical and no posterior cervical adenopathy present  Left cervical: No superficial cervical, no deep cervical and no posterior cervical adenopathy present  Neurological: He is alert and oriented to person, place, and time  No cranial nerve deficit or sensory deficit  Skin: Skin is warm, dry and intact  Psychiatric: His speech is normal and behavior is normal  Judgment normal  His mood appears not anxious  Cognition and memory are normal  He does not exhibit a depressed mood  Vitals reviewed

## 2020-02-26 DIAGNOSIS — F33.1 MODERATE EPISODE OF RECURRENT MAJOR DEPRESSIVE DISORDER (HCC): ICD-10-CM

## 2020-02-26 DIAGNOSIS — F41.9 ANXIETY: ICD-10-CM

## 2020-04-02 DIAGNOSIS — F41.9 ANXIETY: ICD-10-CM

## 2020-04-02 RX ORDER — ALPRAZOLAM 0.5 MG/1
0.5 TABLET ORAL DAILY PRN
Qty: 30 TABLET | Refills: 0 | Status: SHIPPED | OUTPATIENT
Start: 2020-04-02 | End: 2020-07-06 | Stop reason: SDUPTHER

## 2020-07-06 ENCOUNTER — OFFICE VISIT (OUTPATIENT)
Dept: FAMILY MEDICINE CLINIC | Facility: CLINIC | Age: 25
End: 2020-07-06
Payer: COMMERCIAL

## 2020-07-06 VITALS
HEIGHT: 66 IN | TEMPERATURE: 98.8 F | HEART RATE: 87 BPM | OXYGEN SATURATION: 97 % | DIASTOLIC BLOOD PRESSURE: 62 MMHG | SYSTOLIC BLOOD PRESSURE: 110 MMHG | BODY MASS INDEX: 19.13 KG/M2 | WEIGHT: 119 LBS | RESPIRATION RATE: 16 BRPM

## 2020-07-06 DIAGNOSIS — R63.4 ABNORMAL WEIGHT LOSS: ICD-10-CM

## 2020-07-06 DIAGNOSIS — F33.1 MODERATE EPISODE OF RECURRENT MAJOR DEPRESSIVE DISORDER (HCC): ICD-10-CM

## 2020-07-06 DIAGNOSIS — F41.9 ANXIETY: Primary | ICD-10-CM

## 2020-07-06 PROCEDURE — 3008F BODY MASS INDEX DOCD: CPT | Performed by: FAMILY MEDICINE

## 2020-07-06 PROCEDURE — 99214 OFFICE O/P EST MOD 30 MIN: CPT | Performed by: FAMILY MEDICINE

## 2020-07-06 RX ORDER — ALPRAZOLAM 0.5 MG/1
0.5 TABLET ORAL DAILY PRN
Qty: 30 TABLET | Refills: 0 | Status: SHIPPED | OUTPATIENT
Start: 2020-07-06 | End: 2020-11-24 | Stop reason: SDUPTHER

## 2020-07-06 NOTE — PROGRESS NOTES
Assessment/Plan:   1  Anxiety/Moderate episode of recurrent major depressive disorder ([facility] [address] )  Reviewed patient's symptoms today  Symptoms appear very well controlled  Will continue at this time with current treatment of Zoloft  He has also been taking his alprazolam p r n     Will continue with his current treatment  PDMP did not show any abnormalities  - ALPRAZolam (XANAX) 0 5 mg tablet; Take 1 tablet (0 5 mg total) by mouth daily as needed for anxiety  Dispense: 30 tablet; Refill: 0    2  Abnormal weight loss  Stable today  Patient has been taking his cyproheptadine regularly and tolerating this well  Will continue with his current treatment  Follow up patient in 6 months  Diagnoses and all orders for this visit:    Anxiety  -     ALPRAZolam (XANAX) 0 5 mg tablet; Take 1 tablet (0 5 mg total) by mouth daily as needed for anxiety          Subjective:       Chief Complaint   Patient presents with    Follow-up     med check       Patient ID: Rico Boss is a 31 y o  male  Patient is a 50-year-old male presents today for follow-up on his chronic conditions  He has depression with anxiety  He denies any recent exacerbations of his symptoms  At this time, he has been taking his sertraline as well as his alprazolam regularly  He denies adverse reactions with his medications  Review of Systems   Constitutional: Negative for activity change, chills, fatigue and fever  HENT: Negative for congestion, ear pain, sinus pressure and sore throat  Eyes: Negative for redness, itching and visual disturbance  Respiratory: Negative for cough and shortness of breath  Cardiovascular: Negative for chest pain and palpitations  Gastrointestinal: Negative for abdominal pain, diarrhea and nausea  Endocrine: Negative for cold intolerance and heat intolerance  Genitourinary: Negative for dysuria, flank pain and frequency     Musculoskeletal: Negative for arthralgias, back pain, gait problem and myalgias  Skin: Negative for color change  Allergic/Immunologic: Negative for environmental allergies  Neurological: Negative for dizziness, numbness and headaches  Psychiatric/Behavioral: Negative for behavioral problems and sleep disturbance  The following portions of the patient's history were reviewed and updated as appropriate : past family history, past medical history, past social history and past surgical history  Current Outpatient Medications:     ALPRAZolam (XANAX) 0 5 mg tablet, Take 1 tablet (0 5 mg total) by mouth daily as needed for anxiety, Disp: 30 tablet, Rfl: 0    cyproheptadine (PERIACTIN) 4 mg tablet, Take 1 tablet (4 mg total) by mouth 2 (two) times a day as needed for allergies, Disp: 60 tablet, Rfl: 3    sertraline (ZOLOFT) 50 mg tablet, Take 1 5 tablets (75 mg total) by mouth daily, Disp: 45 tablet, Rfl: 5         Objective:         Vitals:    07/06/20 1151   BP: 110/62   BP Location: Right arm   Patient Position: Sitting   Cuff Size: Adult   Pulse: 87   Resp: 16   Temp: 98 8 °F (37 1 °C)   TempSrc: Tympanic   SpO2: 97%   Weight: 54 kg (119 lb)   Height: 5' 6" (1 676 m)     Physical Exam   Constitutional: He is oriented to person, place, and time  He appears well-developed and well-nourished  HENT:   Head: Normocephalic and atraumatic  Nose: Nose normal    Mouth/Throat: No oropharyngeal exudate  Eyes: Pupils are equal, round, and reactive to light  Right eye exhibits no discharge  Left eye exhibits no discharge  Neck: Normal range of motion  Neck supple  No tracheal deviation present  Cardiovascular: Normal rate, regular rhythm and intact distal pulses  Exam reveals no gallop and no friction rub  No murmur heard  Pulses:       Dorsalis pedis pulses are 2+ on the right side, and 2+ on the left side  Posterior tibial pulses are 2+ on the right side, and 2+ on the left side     Pulmonary/Chest: Effort normal and breath sounds normal  No respiratory distress  He has no wheezes  He has no rales  Abdominal: Soft  Bowel sounds are normal  He exhibits no distension  There is no tenderness  There is no rebound and no guarding  Musculoskeletal: Normal range of motion  He exhibits no edema  Lymphadenopathy:        Head (right side): No submental and no submandibular adenopathy present  Head (left side): No submental and no submandibular adenopathy present  He has no cervical adenopathy  Right cervical: No superficial cervical, no deep cervical and no posterior cervical adenopathy present  Left cervical: No superficial cervical, no deep cervical and no posterior cervical adenopathy present  Neurological: He is alert and oriented to person, place, and time  No cranial nerve deficit or sensory deficit  Skin: Skin is warm, dry and intact  Psychiatric: His speech is normal and behavior is normal  Judgment normal  His mood appears not anxious  Cognition and memory are normal  He does not exhibit a depressed mood  Vitals reviewed

## 2020-08-06 DIAGNOSIS — F33.1 MODERATE EPISODE OF RECURRENT MAJOR DEPRESSIVE DISORDER (HCC): ICD-10-CM

## 2020-08-06 DIAGNOSIS — F41.9 ANXIETY: ICD-10-CM

## 2020-09-03 DIAGNOSIS — R63.4 ABNORMAL WEIGHT LOSS: ICD-10-CM

## 2020-09-03 DIAGNOSIS — F41.9 ANXIETY: ICD-10-CM

## 2020-09-03 DIAGNOSIS — F33.1 MODERATE EPISODE OF RECURRENT MAJOR DEPRESSIVE DISORDER (HCC): ICD-10-CM

## 2020-09-03 RX ORDER — CYPROHEPTADINE HYDROCHLORIDE 4 MG/1
4 TABLET ORAL 2 TIMES DAILY PRN
Qty: 60 TABLET | Refills: 3 | Status: SHIPPED | OUTPATIENT
Start: 2020-09-03 | End: 2021-01-11 | Stop reason: SDUPTHER

## 2020-11-24 DIAGNOSIS — F41.9 ANXIETY: ICD-10-CM

## 2020-11-24 RX ORDER — ALPRAZOLAM 0.5 MG/1
0.5 TABLET ORAL DAILY PRN
Qty: 30 TABLET | Refills: 0 | Status: SHIPPED | OUTPATIENT
Start: 2020-11-24 | End: 2021-01-11 | Stop reason: SDUPTHER

## 2021-01-11 ENCOUNTER — TELEPHONE (OUTPATIENT)
Dept: ADMINISTRATIVE | Facility: OTHER | Age: 26
End: 2021-01-11

## 2021-01-11 ENCOUNTER — OFFICE VISIT (OUTPATIENT)
Dept: FAMILY MEDICINE CLINIC | Facility: CLINIC | Age: 26
End: 2021-01-11
Payer: COMMERCIAL

## 2021-01-11 VITALS
RESPIRATION RATE: 16 BRPM | OXYGEN SATURATION: 97 % | SYSTOLIC BLOOD PRESSURE: 120 MMHG | WEIGHT: 110.8 LBS | HEIGHT: 66 IN | BODY MASS INDEX: 17.81 KG/M2 | TEMPERATURE: 98 F | HEART RATE: 76 BPM | DIASTOLIC BLOOD PRESSURE: 88 MMHG

## 2021-01-11 DIAGNOSIS — F41.1 GAD (GENERALIZED ANXIETY DISORDER): ICD-10-CM

## 2021-01-11 DIAGNOSIS — F33.1 MODERATE EPISODE OF RECURRENT MAJOR DEPRESSIVE DISORDER (HCC): ICD-10-CM

## 2021-01-11 DIAGNOSIS — Z23 NEED FOR PROPHYLACTIC VACCINATION AND INOCULATION AGAINST INFLUENZA: Primary | ICD-10-CM

## 2021-01-11 DIAGNOSIS — F41.9 ANXIETY: ICD-10-CM

## 2021-01-11 DIAGNOSIS — R63.4 ABNORMAL WEIGHT LOSS: ICD-10-CM

## 2021-01-11 PROCEDURE — 90682 RIV4 VACC RECOMBINANT DNA IM: CPT | Performed by: FAMILY MEDICINE

## 2021-01-11 PROCEDURE — 99214 OFFICE O/P EST MOD 30 MIN: CPT | Performed by: FAMILY MEDICINE

## 2021-01-11 PROCEDURE — 3008F BODY MASS INDEX DOCD: CPT | Performed by: FAMILY MEDICINE

## 2021-01-11 PROCEDURE — 3725F SCREEN DEPRESSION PERFORMED: CPT | Performed by: FAMILY MEDICINE

## 2021-01-11 PROCEDURE — 90471 IMMUNIZATION ADMIN: CPT | Performed by: FAMILY MEDICINE

## 2021-01-11 RX ORDER — BUSPIRONE HYDROCHLORIDE 5 MG/1
5 TABLET ORAL 2 TIMES DAILY
Qty: 60 TABLET | Refills: 2 | Status: SHIPPED | OUTPATIENT
Start: 2021-01-11 | End: 2021-02-22 | Stop reason: SDUPTHER

## 2021-01-11 RX ORDER — ALPRAZOLAM 0.5 MG/1
0.5 TABLET ORAL DAILY PRN
Qty: 30 TABLET | Refills: 0 | Status: SHIPPED | OUTPATIENT
Start: 2021-01-11

## 2021-01-11 RX ORDER — CYPROHEPTADINE HYDROCHLORIDE 4 MG/1
4 TABLET ORAL 2 TIMES DAILY PRN
Qty: 180 TABLET | Refills: 1 | Status: SHIPPED | OUTPATIENT
Start: 2021-01-11 | End: 2021-04-11

## 2021-01-11 RX ORDER — CYPROHEPTADINE HYDROCHLORIDE 4 MG/1
4 TABLET ORAL 2 TIMES DAILY PRN
Qty: 60 TABLET | Refills: 3 | Status: SHIPPED | OUTPATIENT
Start: 2021-01-11 | End: 2021-01-11 | Stop reason: SDUPTHER

## 2021-01-11 NOTE — PROGRESS NOTES
Assessment/Plan:   1  ZACK (generalized anxiety disorder)    Appears not optimally controlled  At this time, will continue with Zoloft however will add BuSpar 5 mg b i d  He may continue with Xanax p r n  for symptom relief  Follow up patient in 1 month to reassess his symptom control   - ALPRAZolam (XANAX) 0 5 mg tablet; Take 1 tablet (0 5 mg total) by mouth daily as needed for anxiety  Dispense: 30 tablet; Refill: 0  - busPIRone (BUSPAR) 5 mg tablet; Take 1 tablet (5 mg total) by mouth 2 (two) times a day  Dispense: 60 tablet; Refill: 2    2  Abnormal weight loss    Patient's weight has been stable  At this time will continue his current treatment of his cyproheptadine   - cyproheptadine (PERIACTIN) 4 mg tablet; Take 1 tablet (4 mg total) by mouth 2 (two) times a day as needed for allergies  Dispense: 180 tablet; Refill: 1    3  Need for prophylactic vaccination and inoculation against influenza  - FLUBLOK: influenza vaccine, quadrivalent, recombinant, PF, 0 5 mL             Diagnoses and all orders for this visit:    Need for prophylactic vaccination and inoculation against influenza  -     FLUBLOK: influenza vaccine, quadrivalent, recombinant, PF, 0 5 mL    ZACK (generalized anxiety disorder)  -     ALPRAZolam (XANAX) 0 5 mg tablet; Take 1 tablet (0 5 mg total) by mouth daily as needed for anxiety  -     busPIRone (BUSPAR) 5 mg tablet; Take 1 tablet (5 mg total) by mouth 2 (two) times a day    Abnormal weight loss  -     cyproheptadine (PERIACTIN) 4 mg tablet; Take 1 tablet (4 mg total) by mouth 2 (two) times a day as needed for allergies    Moderate episode of recurrent major depressive disorder (HCC)          Subjective:       Chief Complaint   Patient presents with    Follow-up     med check       Patient ID: Jean Marie Olsen is a 31 y o  male  Patient is a 14-year-old male presents today for follow-up on his conditions  He has generalized anxiety disorder as well as weight loss    He has been taking his medications regularly  He states that his alprazolam appears to be effective  Intermittently uses injection  He has been noticing persistence of his anxiety and a baseline level  Denies any panic symptoms  He would like to consider other treatment options  He does not believe he has Zoloft has been effective  Review of Systems   Constitutional: Negative for activity change, chills, fatigue and fever  HENT: Negative for congestion, ear pain, sinus pressure and sore throat  Eyes: Negative for redness, itching and visual disturbance  Respiratory: Negative for cough and shortness of breath  Cardiovascular: Negative for chest pain and palpitations  Gastrointestinal: Negative for abdominal pain, diarrhea and nausea  Endocrine: Negative for cold intolerance and heat intolerance  Genitourinary: Negative for dysuria, flank pain and frequency  Musculoskeletal: Negative for arthralgias, back pain, gait problem and myalgias  Skin: Negative for color change  Allergic/Immunologic: Negative for environmental allergies  Neurological: Negative for dizziness, numbness and headaches  Psychiatric/Behavioral: Negative for behavioral problems and sleep disturbance  The following portions of the patient's history were reviewed and updated as appropriate : past family history, past medical history, past social history and past surgical history      Current Outpatient Medications:     ALPRAZolam (XANAX) 0 5 mg tablet, Take 1 tablet (0 5 mg total) by mouth daily as needed for anxiety, Disp: 30 tablet, Rfl: 0    cyproheptadine (PERIACTIN) 4 mg tablet, Take 1 tablet (4 mg total) by mouth 2 (two) times a day as needed for allergies, Disp: 60 tablet, Rfl: 3    sertraline (ZOLOFT) 50 mg tablet, Take 1 5 tablets (75 mg total) by mouth daily, Disp: 45 tablet, Rfl: 5    busPIRone (BUSPAR) 5 mg tablet, Take 1 tablet (5 mg total) by mouth 2 (two) times a day, Disp: 60 tablet, Rfl: 2 Objective:         Vitals:    01/11/21 1221   BP: 120/88   BP Location: Left arm   Patient Position: Sitting   Cuff Size: Adult   Pulse: 76   Resp: 16   Temp: 98 °F (36 7 °C)   TempSrc: Tympanic   SpO2: 97%   Weight: 50 3 kg (110 lb 12 8 oz)   Height: 5' 6" (1 676 m)     Physical Exam  Vitals signs reviewed  Constitutional:       Appearance: He is well-developed  HENT:      Head: Normocephalic and atraumatic  Nose: Nose normal       Mouth/Throat:      Pharynx: No oropharyngeal exudate  Eyes:      General: No scleral icterus  Right eye: No discharge  Left eye: No discharge  Pupils: Pupils are equal, round, and reactive to light  Neck:      Musculoskeletal: Normal range of motion and neck supple  Trachea: No tracheal deviation  Cardiovascular:      Rate and Rhythm: Normal rate and regular rhythm  Pulses:           Dorsalis pedis pulses are 2+ on the right side and 2+ on the left side  Posterior tibial pulses are 2+ on the right side and 2+ on the left side  Heart sounds: Normal heart sounds  No murmur  No friction rub  No gallop  Pulmonary:      Effort: Pulmonary effort is normal  No respiratory distress  Breath sounds: Normal breath sounds  No wheezing or rales  Abdominal:      General: Bowel sounds are normal  There is no distension  Palpations: Abdomen is soft  Tenderness: There is no abdominal tenderness  There is no guarding or rebound  Musculoskeletal: Normal range of motion  Lymphadenopathy:      Head:      Right side of head: No submental or submandibular adenopathy  Left side of head: No submental or submandibular adenopathy  Cervical: No cervical adenopathy  Right cervical: No superficial, deep or posterior cervical adenopathy  Left cervical: No superficial, deep or posterior cervical adenopathy  Skin:     General: Skin is warm and dry  Findings: No erythema     Neurological:      Mental Status: He is alert and oriented to person, place, and time  Cranial Nerves: No cranial nerve deficit  Sensory: No sensory deficit  Psychiatric:         Mood and Affect: Mood is not anxious or depressed  Speech: Speech normal          Behavior: Behavior normal          Thought Content:  Thought content normal          Judgment: Judgment normal

## 2021-01-11 NOTE — TELEPHONE ENCOUNTER
Upon review of the In Basket request we were able to locate, review, and update the patient chart as requested for HIV  Any additional questions or concerns should be emailed to the Practice Liaisons via [email or web address] email, please do not reply via In Basket      Thank you  Milli Ivy

## 2021-01-11 NOTE — TELEPHONE ENCOUNTER
----- Message from Vitaliy Hernández MA sent at 1/9/2021 10:10 AM EST -----  Regarding: HIV screening  01/09/21 10:11 AM    Hello, our patient Fili Naylor has had HIV screening completed/performed  Please assist in updating the patient chart by flowing from care everywhere  The date of service is 4/3/13       Thank you,  Vitaliy Hernández MA  [facility]

## 2021-02-22 ENCOUNTER — OFFICE VISIT (OUTPATIENT)
Dept: FAMILY MEDICINE CLINIC | Facility: CLINIC | Age: 26
End: 2021-02-22
Payer: COMMERCIAL

## 2021-02-22 VITALS
WEIGHT: 114 LBS | SYSTOLIC BLOOD PRESSURE: 100 MMHG | HEIGHT: 67 IN | DIASTOLIC BLOOD PRESSURE: 68 MMHG | HEART RATE: 95 BPM | OXYGEN SATURATION: 98 % | RESPIRATION RATE: 16 BRPM | BODY MASS INDEX: 17.89 KG/M2 | TEMPERATURE: 98.5 F

## 2021-02-22 DIAGNOSIS — F41.1 GAD (GENERALIZED ANXIETY DISORDER): Primary | ICD-10-CM

## 2021-02-22 PROCEDURE — 99213 OFFICE O/P EST LOW 20 MIN: CPT | Performed by: FAMILY MEDICINE

## 2021-02-22 PROCEDURE — 3008F BODY MASS INDEX DOCD: CPT | Performed by: FAMILY MEDICINE

## 2021-02-22 RX ORDER — BUSPIRONE HYDROCHLORIDE 5 MG/1
5 TABLET ORAL 2 TIMES DAILY
Qty: 180 TABLET | Refills: 1 | Status: SHIPPED | OUTPATIENT
Start: 2021-02-22 | End: 2021-05-23

## 2021-02-22 NOTE — PROGRESS NOTES
Assessment/Plan:   1  ZACK (generalized anxiety disorder)    Patient's mood appears very well controlled today  He has anxiety appears much better controlled  He denies any recent panic attacks  He states that he has been tolerating his BuSpar as well as his Zoloft  He has decreased in the frequency that he uses his alprazolam   Will continue with his current medication regimen  Follow up with patient in 3-4 months  - busPIRone (BUSPAR) 5 mg tablet; Take 1 tablet (5 mg total) by mouth 2 (two) times a day  Dispense: 180 tablet; Refill: 1    BMI Counseling: Body mass index is 17 85 kg/m²  The BMI is below normal  Patient advised to gain weight  Patient referred to PCP due to patient being underweight  Diagnoses and all orders for this visit:    ZACK (generalized anxiety disorder)          Subjective:       Chief Complaint   Patient presents with    Follow-up     1M Med Check (Deps/Anxiety)      Patient ID: Bryant Vaughn is a 31 y o  male  Presents today for follow-up on his generalized anxiety disorder  Since his last visit, he states that his BuSpar has been effective for him  He states that he only occasionally forgets to take the 2nd dose of the day  HPI    Review of Systems   Constitutional: Negative for activity change, chills, fatigue and fever  HENT: Negative for congestion, ear pain, sinus pressure and sore throat  Eyes: Negative for redness, itching and visual disturbance  Respiratory: Negative for cough and shortness of breath  Cardiovascular: Negative for chest pain and palpitations  Gastrointestinal: Negative for abdominal pain, diarrhea and nausea  Endocrine: Negative for cold intolerance and heat intolerance  Genitourinary: Negative for dysuria, flank pain and frequency  Musculoskeletal: Negative for arthralgias, back pain, gait problem and myalgias  Skin: Negative for color change  Allergic/Immunologic: Negative for environmental allergies  Neurological: Negative for dizziness, numbness and headaches  Psychiatric/Behavioral: Negative for behavioral problems and sleep disturbance  The following portions of the patient's history were reviewed and updated as appropriate : past family history, past medical history, past social history and past surgical history  Current Outpatient Medications:     ALPRAZolam (XANAX) 0 5 mg tablet, Take 1 tablet (0 5 mg total) by mouth daily as needed for anxiety, Disp: 30 tablet, Rfl: 0    busPIRone (BUSPAR) 5 mg tablet, Take 1 tablet (5 mg total) by mouth 2 (two) times a day, Disp: 60 tablet, Rfl: 2    cyproheptadine (PERIACTIN) 4 mg tablet, Take 1 tablet (4 mg total) by mouth 2 (two) times a day as needed for allergies, Disp: 180 tablet, Rfl: 1    sertraline (ZOLOFT) 50 mg tablet, Take 1 5 tablets (75 mg total) by mouth daily, Disp: 135 tablet, Rfl: 1         Objective:         Vitals:    02/22/21 1355   BP: 100/68   BP Location: Left arm   Patient Position: Sitting   Cuff Size: Standard   Pulse: 95   Resp: 16   Temp: 98 5 °F (36 9 °C)   TempSrc: Tympanic   SpO2: 98%   Weight: 51 7 kg (114 lb)   Height: 5' 7" (1 702 m)     Physical Exam  Vitals signs reviewed  Constitutional:       Appearance: He is well-developed  HENT:      Head: Normocephalic and atraumatic  Nose: Nose normal       Mouth/Throat:      Pharynx: No oropharyngeal exudate  Eyes:      General: No scleral icterus  Right eye: No discharge  Left eye: No discharge  Pupils: Pupils are equal, round, and reactive to light  Neck:      Musculoskeletal: Normal range of motion and neck supple  Trachea: No tracheal deviation  Cardiovascular:      Rate and Rhythm: Normal rate and regular rhythm  Pulses:           Dorsalis pedis pulses are 2+ on the right side and 2+ on the left side  Posterior tibial pulses are 2+ on the right side and 2+ on the left side  Heart sounds: Normal heart sounds   No murmur  No friction rub  No gallop  Pulmonary:      Effort: Pulmonary effort is normal  No respiratory distress  Breath sounds: Normal breath sounds  No wheezing or rales  Abdominal:      General: Bowel sounds are normal  There is no distension  Palpations: Abdomen is soft  Tenderness: There is no abdominal tenderness  There is no guarding or rebound  Musculoskeletal: Normal range of motion  Lymphadenopathy:      Head:      Right side of head: No submental or submandibular adenopathy  Left side of head: No submental or submandibular adenopathy  Cervical: No cervical adenopathy  Right cervical: No superficial, deep or posterior cervical adenopathy  Left cervical: No superficial, deep or posterior cervical adenopathy  Skin:     General: Skin is warm and dry  Findings: No erythema  Neurological:      Mental Status: He is alert and oriented to person, place, and time  Cranial Nerves: No cranial nerve deficit  Sensory: No sensory deficit  Psychiatric:         Mood and Affect: Mood is not anxious or depressed  Speech: Speech normal          Behavior: Behavior normal          Thought Content:  Thought content normal          Judgment: Judgment normal

## 2021-04-14 DIAGNOSIS — Z23 ENCOUNTER FOR IMMUNIZATION: ICD-10-CM

## 2021-06-17 DIAGNOSIS — R63.4 ABNORMAL WEIGHT LOSS: ICD-10-CM

## 2021-06-17 RX ORDER — CYPROHEPTADINE HYDROCHLORIDE 4 MG/1
TABLET ORAL
Qty: 180 TABLET | Refills: 3 | OUTPATIENT
Start: 2021-06-17

## 2024-05-15 NOTE — OP NOTE
**** GI/ENDOSCOPY REPORT ****     PATIENT NAME: Lenka Richard - VISIT ID:  Patient ID: [id number]   YOB: 1995     INTRODUCTION: Esophagogastroduodenoscopy - A 20 male patient presents for   an outpatient Esophagogastroduodenoscopy at [place] [address]  INDICATIONS: Loss of weight  Diarrhea  CONSENT: The benefits, risks, and alternatives to the procedure were   discussed and informed consent was obtained from the patient  PREPARATION:  EKG, pulse, pulse oximetry and blood pressure were monitored   throughout the procedure  ASA Classification: Class 2 - Patient has mild   to moderate systemic disturbance that may or may not be related to the   disorder requiring surgery  MEDICATIONS: Anesthesia-check records     PROCEDURE:  The endoscope was passed without difficulty through the mouth   under direct visualization and advanced to the 2nd portion of the   duodenum  The scope was withdrawn and the mucosa was carefully examined  Retroflexion was performed  FINDINGS:   Esophagus: The esophagus appeared to be normal    GE junction:   There was a small sliding hiatus hernia visible in the GE junction  Stomach: The stomach appeared to be normal     Multiple random biopsies   was taken  Duodenum: The duodenum appeared to be normal    Multiple   random biopsies was taken  COMPLICATIONS: There were no complications  IMPRESSIONS: Normal esophagus  A hiatus hernia found  Normal stomach  Multiple biopsies taken  Normal duodenum  Multiple biopsies taken  RECOMMENDATIONS: Follow-up on the results of the biopsy specimens  Anti-reflux measures: Raise the head of the bed 4 to 6 inches  Avoid   smoking  Avoid excess coffee, tea or other caffeinated beverages  Avoid   garments that fit tightly through the abdomen  Avoid eating before bed  Continue current medications  Colonoscopy to follow       ESTIMATED BLOOD LOSS:     PATHOLOGY SPECIMENS: *Note to patient: The 21st Century Cures Act makes medical notes like these available to patients in the interest of transparency. However, this is a medical document intended as peer to peer communication. It is written in medical language and may contain abbreviations or verbiage that are unfamiliar.  It may appear blunt or direct. Medical documents are intended to carry relevant information, facts as evident, and the clinical impression/opinion of the practitioner*     PCP: Srinivas Fields MD    It was a pleasure to see Seth Renteria for follow-up today at the [facility].    IMPRESSION & RECOMMENDATIONS:   Independently reviewed clinical history, pertinent medical records, and physical exam as well as various CV studies with patient.     # Abnormal echocardiogram - RV enlargement as well as right atrial enlargement noted on echocardiogram. Likely etiology due to ASHLEY.   - repeat echocardiogram 11/2017 (following syncope) revealed normal LV systolic function, LVEF 55%, grade 2 diastolic dysfunction, and mild left atrial enlargement with normal right-sided cardiac structures.   - subsequent studies including recent echocardiogram per HPI revealed normal LV systolic function   - emphasis on risk factor modification below      # Carotid artery disease - mild bilateral carotid disease by Doppler.   - unchanged on latest study 8/2022  - Aggressive risk factor modification since this is a CAD-risk equivalent.   - Follow clinically and with triennial carotid imaging    # Hypertension - BP goal <140/90mmHg (per 2018 ESC/ESH HTN guidelines)  - attributes elevated BP to missing medications for the past several days; expressly declines medication titration and elects to continue current dose lisinopril (take 10 mg twice daily)  - Encouraged to maintain an exercise program in attempt to lower their blood pressure.    - monitor BP     # Hyperlipidemia - lipid targets of LDL optimally <100mg/dL (per 2017  AACE/ACE guidelines), HDL >50mg/dL and triglycerides <150mg/dL.   - repeatedly declines LLA, \"want to do it on my own;\" understands medical consequences of his decision.   - Advised various dietary and lifestyle changes.     # Obesity - at risk for metabolic syndrome (CAD-risk equivalent).   - Discussed various weight loss strategies. Continue weight management.     # Obstructive sleep apnea - previous diagnosis.   - might contribute to prior RA/RV enlargement   - since started CPAP; encouraged compliance  - recommendations per Pulm medicine     # Preventive Cardiology - emphasized lifestyle changes of weight loss, moderate intensity aerobic exercise at least 30-40 min 5 days per week (preferentially 7days/week), dietary salt and fat restriction.   - READ: Stretching to Stay Young: Simple Workouts to Keep You Flexible, Energized, and Pain Free (by Hazel Hinojosa)  Akashalber Davenport; Undo It!: How Simple Lifestyle Changes Can Reverse Most Chronic Diseases   (2019)  - s/p Pfizer vaccines  - After-visit summary provided to patient.      # Return visit in 6 months with Cardiology APN (and Dr. Solis in 1 year) or sooner if needed.   - all questions were answered to the patient's satisfaction and to the best of my abilities       HISTORY OF PRESENT ILLNESS:      As you know, he is a 65 year old  male with cardiovascular issues including abnormal echocardiogram, HTN, HLD, and obesity as well as prior diagnosis of ASHLEY but apparently unable to tolerate CPAP.     He was last seen in the cardiology clinic by me 11/2023 + Cardiology APN Kalyn Dominguez 2/2024. Over the interim time period, he underwent echocardiogram demonstrated normal LV systolic function, LVEF 60% while event monitor showed NSR without high-grade dysrhythmias    From CV standpoint since last visit, he has done reasonably well and reports that his exercise tolerance is unchanged; able to walk 2-3 miles and can climb 1-2 flights of stairs without  Multiple random biopsies taken  Multiple random   biopsies taken  PROCEDURE CODES:     ICD-9 Codes: 783 21 Loss of weight 787 91 Diarrhea 553 3 Diaphragmatic   hernia without mention of obstruction or gangrene     ICD-10 Codes: R63 4 Abnormal weight loss R19 7 Diarrhea, unspecified K44   Diaphragmatic hernia     PERFORMED BY: MARBELLA Bales  on 02/21/2018  Version 1, electronically signed by MARBELLA Brewer  on 02/21/2018   at 12:18  difficulty. Understands heart-healthy lifestyle and slowly making appropriate changes. Stays active working out five times a week \"at the gym. Play racquetball.\" Continues to enjoy skilled nursing. More consistent with nightly CPAP. Tries to be vigilant about dietary sodium and fat intake. No significant lightheadedness. Otherwise, he denies symptoms of chest pain, shortness of breath, dyspnea on exertion, palpitations, diaphoresis, nausea and vomiting, claudication, LE edema, PND, orthopnea, syncope or presyncopal spells.       Previous Medical History:   Patient Active Problem List   Diagnosis    Hypercalcemia    Nontoxic uninodular goiter    Hyperparathyroidism  (CMD)    HTN (hypertension)    Choroidal nevus, left    Hyperlipidemia    Obstructive sleep apnea (adult) (pediatric)    Class 1 obesity due to excess calories with body mass index (BMI) of 31.0 to 31.9 in adult    Stiffness of cervical spine    Amblyopia of right eye    Ganglion cyst       Past Medical History:   Diagnosis Date    Adjustment disorder with anxiety 11/11/2014    Choroidal nevus, left 10/11/2018    Cortical senile cataract, bilateral 10/11/2018    Fracture     right leg 20yrs ago    Ganglion cyst 05/23/2022    left Ankle    HTN (hypertension) 06/22/2015    Hypercalcemia 08/04/2016    Hyperlipidemia 04/16/2014    Hyperparathyroidism  (CMD) 05/23/2011    Motion sickness     Nontoxic uninodular goiter 08/17/2015    Parathyroid    ASHLEY (obstructive sleep apnea) 03/13/2016    Vitamin D deficiency 05/23/2011       He has No Known Allergies.      Medications:    Current Outpatient Medications   Medication Sig Dispense Refill    lisinopril (ZESTRIL) 10 MG tablet TAKE 1 TABLET BY MOUTH TWICE DAILY (Patient taking differently: Take 10 mg by mouth daily.) 180 tablet 3    Ascorbic Acid (vitamin C) 100 MG tablet Take 100 mg by mouth daily.      TURMERIC PO Take 1 tablet by mouth daily.       Magnesium Gluconate (MAGNESIUM 27 PO) Take 1 tablet by mouth daily.        Omega-3 Fatty Acids (FISH OIL) 1000 MG capsule Take 2 g by mouth daily.      Multiple Vitamins-Minerals (MULTIVITAL PO) Take 1 tablet by mouth daily.        No current facility-administered medications for this visit.       Social History:   Social History     Socioeconomic History    Marital status:     Number of children: 1    Highest education level: Not asked   Occupational History    Occupation: Retired      Comment: [employer]   Tobacco Use    Smoking status: Never    Smokeless tobacco: Never   Vaping Use    Vaping status: never used   Substance and Sexual Activity    Alcohol use: Yes     Comment: once a month or less    Drug use: No    Sexual activity: Yes     Partners: Female   Social History Narrative    Patient got  5 years ago.  He got remarried 3 years ago.  Patient has a 8-year-old child.   - Appointed medical health care power of  is Friend Kirill King      Family History:  Family History   Problem Relation Age of Onset    Cancer Mother         uterine    Cancer, Lung Mother     Patient is unaware of any medical problems Father     Endocrine Disorder Sister     Patient is unaware of any medical problems Brother     Patient is unaware of any medical problems Brother     Patient is unaware of any medical problems Brother     Patient is unaware of any medical problems Brother     Patient is unaware of any medical problems Son          Review of Systems   Constitutional: Negative for fever, chills, weight loss, malaise/fatigue, and weakness.   Skin: Negative for rash and itching.   HENT: Negative for headaches, hearing loss, nosebleeds and congestion.   Eyes: Negative for blurred vision, and eye pain.   Cardiovascular: See HPI.   Respiratory: See HPI.    Gastrointestinal: Negative for nausea, abdominal pain, diarrhea and constipation.   Genitourinary: Negative for dysuria, urgency, frequency and hematuria.   Musculoskeletal:  Negative for myalgias, neck  pain, back pain, and falls.   Endo/Heme/Allergies: Negative for environmental allergies and polydipsia. Does not bruise/bleed easily.   Neurological: Negative for dizziness, tingling, tremors, focal weakness and loss of consciousness.   Psychiatric: Negative for depression and memory loss. Is not nervous/anxious and does not have insomnia.         OBJECTIVE:  Visit Vitals  /76   Pulse (!) 53   Ht 5' 7\" (1.702 m)   Wt 95.3 kg (210 lb)   SpO2 99%   BMI 32.89 kg/m²        Physical Exam   Constitutional: appears well-developed and well-nourished. No apparent distress.   HENT: NC/AT. Nose normal. MMM. EOMI, PERRL. No icterus.   Neck: Supple. No JVD, thyromegaly and carotid bruit.   Cardiovascular: Normal rate, regular rhythm and normal heart sounds. Normal S1 and S2. A2/P2 intact and preserved.  PMI nondisplaced. No murmurs, rubs, or gallops.   Pulmonary/Chest: Effort and breath sounds normal.   Abdominal: Bowel sounds are normal. Soft, benign without organomegaly. No abdominal bruits.   Musculoskeletal: Normal range of motion of neck, back, upper extremities, and lower extremities.   Neurological: Alert and oriented x3. Gait intact.  Extremities: No edema.  Radial, femoral, dorsalis pedis and posterior tibial pulses intact 1-2+. No femoral bruits.  Skin: Skin is warm and dry. Not diaphoretic.   Psychiatric: Normal mood and affect. Behavior is normal.      LABS/TESTING:     EKG (11/7/2023): Study independently reviewed with my interpretations provided   - sinus bradycardia     EKG (7/5/2022): Study independently reviewed with my interpretations provided   - sinus bradycardia, baseline wander    EKG (2/1/2021): Study independently reviewed with my interpretations provided   - normal sinus rhythm, baseline wander     EKG (10/24/2018): Study independently reviewed with my interpretations provided   - sinus bradycardia      EKG (5/24/2017): Study independently reviewed with my interpretations provided   - sinus  bradycardia      Echocardiogram 12/8/2023:  1. Left ventricle: The cavity size is normal. Wall thickness is mildly increased. Systolic function is normal. The ejection fraction was measured by biplane method of disks. Wall motion is normal; there are no regional wall motion abnormalities. Left ventricular diastolic function parameters are normal. The ejection fraction is 60%.  2. Right ventricle: The cavity size is normal. Wall thickness is normal. Systolic function is normal    14-day event monitor (11/9/2023 - 11/23/2023): Study independently reviewed with my interpretations provided   Predominantly NSR (HR variability  bpm) with rare ectopy and no high-grade dysrhythmias.   - patient events correlate with sinus rhythm  Normal study.     Echocardiogram (10/21/2020):  1. Left ventricle: The cavity size is normal. Wall thickness is mildly increased. The ejection fraction was measured by biplane method of disks. Left ventricular diastolic function parameters are indeterminate at present time. The ejection fraction is 55-60%.  2. Left atrium: The atrium is mildly dilated.    Echocardiogram (11/27/2017):   normal LV systolic function, LVEF 55%, grade 2 diastolic dysfunction, and mild left atrial enlargement with normal right-sided cardiac structures.     Echocardiogram (2/18/2016):    * Normal LV size with normal function. LVEF=55%. Left ventricular filling pattern c/w diastolic dysfunction. There is top normal LV wall thickness.    * Mildly dilated right ventricle with normal function.    * Normal left atrium. Normal right atrium.    * Normal, trileaflet aortic valve. There is no aortic stenosis, no regurgitation.    * Normal mitral valve without stenosis or prolapse. There is no mitral regurgitation.    * Normal tricuspid valve. There is no tricuspid regurgitation.    * Unable to assess PA pressure due to lack of tricuspid regurgitation.    * Normal pulmonic valve.      Echocardiogram (2/16/2015):     * Normal  LV size with normal function. LVEF=55%. There is top normal LV wall thickness.    * Diastolic dysfunction by doppler.    * Normal right ventricular size with normal function.    * Normal left atrium.    * Normal right atrium.    * Normal, trileaflet aortic valve. There is no aortic regurgitation.    * Normal mitral valve. There is no mitral regurgitation.    * Normal tricuspid valve. There is no tricuspid regurgitation.    * Normal pulmonic valve.    * Normal aortic root size.     Carotid ultrasound 8/9/2022:  1. Less than 50% stenosis involving the right internal carotid artery.  2. Antegrade flow noted in the right vertebral artery.  3. Less than 50% stenosis involving the left internal carotid artery.  4. Antegrade flow noted in the left vertebral artery.  5. Right thyroid nodual, clinical correlation recommnded.    Carotid US (1/19/2016):     * Right ICA stenosis: <50%.    * Left ICA stenosis: <50%.    * There is antegrade flow in both the right and left vertebral arteries.    * 1.72x1.0 cm right sided thyroid nodule is seen.  Recommend thyroid ultrasound for further evaluation      Home sleep study completed at [facility] on 2/21/2023  Moderate  ASHLEY documented.  [id number]     CPAP titration completed at [facility] on 4/10/2023  ASHLEY reversed with CPAP at a final pressure of 8 CWP      Lab Results   Component Value Date    TSH 1.271 09/14/2023    TSH 0.98 09/30/2021       Lab Services on 03/14/2024   Component Date Value Ref Range Status    Hemoglobin A1C 03/14/2024 6.0 (H)  4.5 - 5.6 % Final    Cholesterol 03/14/2024 227 (H)  <=199 mg/dL Final    Triglycerides 03/14/2024 88  <=149 mg/dL Final    HDL 03/14/2024 53  >=40 mg/dL Final    LDL 03/14/2024 156 (H)  <=129 mg/dL Final    Non-HDL Cholesterol 03/14/2024 174  mg/dL Final    Cholesterol/ HDL Ratio 03/14/2024 4.3  <=4.4 Final    Sodium 03/14/2024 138  135 - 145 mmol/L Final    Potassium 03/14/2024 4.5  3.4 - 5.1  mmol/L Final    Chloride 03/14/2024 103  97 - 110 mmol/L Final    Carbon Dioxide 03/14/2024 26  21 - 32 mmol/L Final    Anion Gap 03/14/2024 14  7 - 19 mmol/L Final    Glucose 03/14/2024 103 (H)  70 - 99 mg/dL Final    BUN 03/14/2024 20  6 - 20 mg/dL Final    Creatinine 03/14/2024 1.32 (H)  0.67 - 1.17 mg/dL Final    Glomerular Filtration Rate 03/14/2024 59 (L)  >=60 Final    BUN/Cr 03/14/2024 15  7 - 25 Final    Calcium 03/14/2024 9.9  8.4 - 10.2 mg/dL Final    Bilirubin, Total 03/14/2024 0.6  0.2 - 1.0 mg/dL Final    GOT/AST 03/14/2024 23  <=37 Units/L Final    GPT/ALT 03/14/2024 35  <64 Units/L Final    Alkaline Phosphatase 03/14/2024 64  45 - 117 Units/L Final    Albumin 03/14/2024 3.7  3.6 - 5.1 g/dL Final    Protein, Total 03/14/2024 6.8  6.4 - 8.2 g/dL Final    Globulin 03/14/2024 3.1  2.0 - 4.0 g/dL Final    A/G Ratio 03/14/2024 1.2  1.0 - 2.4 Final    Testosterone, Male 03/14/2024 291.4  280.0 - 1,100.0 ng/dL Final   Clinical Documentation on 01/19/2024   Component Date Value Ref Range Status    HM COLONOSCOPY 01/15/2024 Abnormal   Final   Hospital Outpatient Visit on 01/15/2024   Component Date Value Ref Range Status    Case Report 01/15/2024    Final                    Value:Surgical Pathology                                Case: [id number]                                 Authorizing Provider:  Annmarie Garcia MD           Collected:           01/15/2024 1127              Ordering Location:     [facility]  Received:            01/16/2024 0938                                     Center                                                                       Pathologist:           Danya Mcgrath MD                                                           Specimen:    Colon, ascending polyp                                                                     Pathologic Diagnosis 01/15/2024    Final                    Value:This result contains rich text formatting which cannot be displayed here.     Clinical Information 01/15/2024    Final                    Value:This result contains rich text formatting which cannot be displayed here.    Gross Description 01/15/2024    Final                    Value:This result contains rich text formatting which cannot be displayed here.    Disclaimer 01/15/2024    Final                    Value:This result contains rich text formatting which cannot be displayed here.   Lab Services on 12/14/2023   Component Date Value Ref Range Status    Cholesterol 12/14/2023 216 (H)  <=199 mg/dL Final    Triglycerides 12/14/2023 123  <=149 mg/dL Final    HDL 12/14/2023 52  >=40 mg/dL Final    LDL 12/14/2023 139 (H)  <=129 mg/dL Final    Non-HDL Cholesterol 12/14/2023 164  mg/dL Final    Cholesterol/ HDL Ratio 12/14/2023 4.2  <=4.4 Final    Ferritin 12/14/2023 16 (L)  26 - 388 ng/mL Final    Iron 12/14/2023 29 (L)  65 - 175 mcg/dL Final    Iron Binding Capacity 12/14/2023 416  250 - 450 mcg/dL Final    Iron, Percent Saturation 12/14/2023 7 (L)  15 - 45 % Final    WBC 12/14/2023 6.1  4.2 - 11.0 K/mcL Final    RBC 12/14/2023 5.08  4.50 - 5.90 mil/mcL Final    HGB 12/14/2023 13.7  13.0 - 17.0 g/dL Final    HCT 12/14/2023 43.8  39.0 - 51.0 % Final    MCV 12/14/2023 86.2  78.0 - 100.0 fl Final    MCH 12/14/2023 27.0  26.0 - 34.0 pg Final    MCHC 12/14/2023 31.3 (L)  32.0 - 36.5 g/dL Final    RDW-CV 12/14/2023 15.2 (H)  11.0 - 15.0 % Final    RDW-SD 12/14/2023 48.2  39.0 - 50.0 fL Final    PLT 12/14/2023 328  140 - 450 K/mcL Final    NRBC 12/14/2023 0  <=0 /100 WBC Final    Neutrophil, Percent 12/14/2023 62  % Final    Lymphocytes, Percent 12/14/2023 23  % Final    Mono, Percent 12/14/2023 11  % Final    Eosinophils, Percent 12/14/2023 3  % Final    Basophils, Percent 12/14/2023 1  % Final    Immature Granulocytes 12/14/2023 0  % Final    Absolute Neutrophils 12/14/2023 3.8  1.8 - 7.7 K/mcL Final    Absolute Lymphocytes 12/14/2023 1.4  1.0 - 4.0 K/mcL Final    Absolute Monocytes 12/14/2023  0.6  0.3 - 0.9 K/mcL Final    Absolute Eosinophils  12/14/2023 0.2  0.0 - 0.5 K/mcL Final    Absolute Basophils 12/14/2023 0.0  0.0 - 0.3 K/mcL Final    Absolute Immature Granulocytes 12/14/2023 0.0  0.0 - 0.2 K/mcL Final    Hemoglobin A1C 12/14/2023 5.7 (H)  4.5 - 5.6 % Final   Ancillary Procedure on 12/08/2023   Component Date Value Ref Range Status    AV Stenosis Severity Text 12/08/2023 Absent   Corrected    Aortic Valve Area 12/08/2023 2.11   Corrected    AV Peak Gradient 12/08/2023 10.00   Corrected    AV Mean Gradient 12/08/2023 6.00   Corrected    AV Peak Velocity 12/08/2023 1.55   Corrected    AV Mean Velocity 12/08/2023 1.13   Corrected    Ejection Fraction 12/08/2023 60%   Corrected    AV VTI (Previously displayed as AV* 12/08/2023 0.66   Corrected    MV Peak E Velocity 12/08/2023 0.77   Corrected    MV Peak A Velocity 12/08/2023 256   Corrected    E Wave Decelaration Time 12/08/2023 10.0423671352   Corrected    MV E Wave Ildefonso/E Tissue Ildefonso Med 12/08/2023 6.15   Corrected    MV E Tissue Ildefonso Med 12/08/2023 5.72   Corrected    MV E Tissue Ildefonso Lat 12/08/2023 0.977   Corrected    Tricuspid Valve Peak Regurgitation* 12/08/2023 3.9   Corrected    Ascending Aorta 12/08/2023 3   Corrected    TV Estimated Right Arterial Pressu* 12/08/2023 11   Corrected    RV End Systolic Longitudinal Strai* 12/08/2023 2.1   Corrected    LV outflow tract 12/08/2023 20.7   Corrected    LVOT VTI 12/08/2023 1.06   Corrected    CAITLIN LVOT Peak Gradient 12/08/2023 1.4   Corrected    LV end diastolic posterior wall th* 12/08/2023 4.7   Corrected    Left Ventricular Internal Dimensio* 12/08/2023 3.1   Corrected    Left Internal Dimenson in Systole 12/08/2023 1.4   Corrected    Interventricular Septum in End Diane* 12/08/2023 2.4   Corrected   Lab Services on 12/01/2023   Component Date Value Ref Range Status    PTH, Intact 12/01/2023 126 (H)  19 - 88 pg/mL Final    Sodium 12/01/2023 141  135 - 145 mmol/L Final    Potassium 12/01/2023 4.5  3.4  - 5.1 mmol/L Final    Chloride 12/01/2023 106  97 - 110 mmol/L Final    Carbon Dioxide 12/01/2023 27  21 - 32 mmol/L Final    Anion Gap 12/01/2023 13  7 - 19 mmol/L Final    Glucose 12/01/2023 96  70 - 99 mg/dL Final    BUN 12/01/2023 11  6 - 20 mg/dL Final    Creatinine 12/01/2023 1.19 (H)  0.67 - 1.17 mg/dL Final    Glomerular Filtration Rate 12/01/2023 68  >=60 Final    BUN/Cr 12/01/2023 9  7 - 25 Final    Calcium 12/01/2023 10.0  8.4 - 10.2 mg/dL Final    Bilirubin, Total 12/01/2023 0.5  0.2 - 1.0 mg/dL Final    GOT/AST 12/01/2023 22  <=37 Units/L Final    GPT/ALT 12/01/2023 35  <64 Units/L Final    Alkaline Phosphatase 12/01/2023 69  45 - 117 Units/L Final    Albumin 12/01/2023 3.7  3.6 - 5.1 g/dL Final    Protein, Total 12/01/2023 7.0  6.4 - 8.2 g/dL Final    Globulin 12/01/2023 3.3  2.0 - 4.0 g/dL Final    A/G Ratio 12/01/2023 1.1  1.0 - 2.4 Final    Vitamin D, 25-Hydroxy 12/01/2023 48.9  30.0 - 100.0 ng/mL Final       Thank you for the pleasure of caring for Seth Renteria. If you have any questions, please do not hesitate to contact us.    Electronically signed by: Jeremy Solis MD, MS, FACC  5/15/2024     [This note used Dragon technology. Transcription errors are not uncommon and may not have been corrected prior to electronically signing the note. Should you find these errors, please consult the clinician for interpretation (or apply common sense adjustment when safe and appropriate).]